# Patient Record
Sex: FEMALE | Race: WHITE | Employment: UNEMPLOYED | URBAN - METROPOLITAN AREA
[De-identification: names, ages, dates, MRNs, and addresses within clinical notes are randomized per-mention and may not be internally consistent; named-entity substitution may affect disease eponyms.]

---

## 2018-09-24 ENCOUNTER — HOSPITAL ENCOUNTER (OUTPATIENT)
Age: 53
Discharge: HOME | End: 2018-09-24

## 2018-09-24 LAB
T4 TOTAL: 9.3 MCG/DL (ref 4.5–11.7)
TSH SERPL DL<=0.05 MIU/L-ACNC: 9.31 UIU/ML (ref 0.27–4.2)

## 2018-09-24 PROCEDURE — 84436 ASSAY OF TOTAL THYROXINE: CPT

## 2018-09-24 PROCEDURE — 36415 COLL VENOUS BLD VENIPUNCTURE: CPT

## 2018-09-24 PROCEDURE — 84443 ASSAY THYROID STIM HORMONE: CPT

## 2018-12-18 PROBLEM — M50.221 HERNIATED NUCLEUS PULPOSUS, C4-5: Status: ACTIVE | Noted: 2018-12-18

## 2018-12-18 PROBLEM — M50.21 HERNIATED NUCLEUS PULPOSUS, C3-4: Status: ACTIVE | Noted: 2018-12-18

## 2018-12-18 PROBLEM — M50.222 HERNIATED NUCLEUS PULPOSUS, C5-6: Status: ACTIVE | Noted: 2018-12-18

## 2023-11-25 ENCOUNTER — HOSPITAL ENCOUNTER (INPATIENT)
Age: 58
LOS: 4 days | DRG: 871 | End: 2023-11-29
Attending: EMERGENCY MEDICINE | Admitting: INTERNAL MEDICINE

## 2023-11-25 ENCOUNTER — APPOINTMENT (OUTPATIENT)
Dept: CT IMAGING | Age: 58
DRG: 871 | End: 2023-11-25

## 2023-11-25 DIAGNOSIS — E87.8 HYPOCHLOREMIA: ICD-10-CM

## 2023-11-25 DIAGNOSIS — E87.6 HYPOKALEMIA: ICD-10-CM

## 2023-11-25 DIAGNOSIS — E87.1 HYPONATREMIA: Primary | ICD-10-CM

## 2023-11-25 DIAGNOSIS — M62.82 NON-TRAUMATIC RHABDOMYOLYSIS: ICD-10-CM

## 2023-11-25 DIAGNOSIS — R57.0: ICD-10-CM

## 2023-11-25 DIAGNOSIS — E03.9 HYPOTHYROIDISM, UNSPECIFIED TYPE: ICD-10-CM

## 2023-11-25 DIAGNOSIS — R41.82 ALTERED MENTAL STATUS, UNSPECIFIED ALTERED MENTAL STATUS TYPE: ICD-10-CM

## 2023-11-25 LAB
ALBUMIN SERPL-MCNC: 5.2 G/DL (ref 3.5–5.2)
ALP SERPL-CCNC: 140 U/L (ref 35–104)
ALT SERPL-CCNC: 42 U/L (ref 0–32)
AMMONIA PLAS-SCNC: 41 UMOL/L (ref 11–51)
AMPHET UR QL SCN: NEGATIVE
ANION GAP SERPL CALCULATED.3IONS-SCNC: 13 MMOL/L (ref 7–16)
AST SERPL-CCNC: 168 U/L (ref 0–31)
BACTERIA URNS QL MICRO: ABNORMAL
BARBITURATES UR QL SCN: NEGATIVE
BASOPHILS # BLD: 0.02 K/UL (ref 0–0.2)
BASOPHILS NFR BLD: 0 % (ref 0–2)
BENZODIAZ UR QL: NEGATIVE
BILIRUB SERPL-MCNC: 0.4 MG/DL (ref 0–1.2)
BILIRUB UR QL STRIP: NEGATIVE
BUN SERPL-MCNC: 13 MG/DL (ref 6–20)
BUPRENORPHINE UR QL: NEGATIVE
CALCIUM SERPL-MCNC: 9.5 MG/DL (ref 8.6–10.2)
CANNABINOIDS UR QL SCN: NEGATIVE
CASTS #/AREA URNS LPF: ABNORMAL /LPF
CHLORIDE SERPL-SCNC: 71 MMOL/L (ref 98–107)
CHLORIDE UR-SCNC: 35 MMOL/L
CK SERPL-CCNC: 7505 U/L (ref 20–180)
CLARITY UR: CLEAR
CO2 SERPL-SCNC: 25 MMOL/L (ref 22–29)
COCAINE UR QL SCN: NEGATIVE
COLOR UR: YELLOW
CREAT SERPL-MCNC: 0.8 MG/DL (ref 0.5–1)
CREAT UR-MCNC: 177.3 MG/DL (ref 29–226)
EOSINOPHIL # BLD: 0.11 K/UL (ref 0.05–0.5)
EOSINOPHILS RELATIVE PERCENT: 1 % (ref 0–6)
EPI CELLS #/AREA URNS HPF: ABNORMAL /HPF
ERYTHROCYTE [DISTWIDTH] IN BLOOD BY AUTOMATED COUNT: 12.4 % (ref 11.5–15)
FENTANYL UR QL: NEGATIVE
GFR SERPL CREATININE-BSD FRML MDRD: >60 ML/MIN/1.73M2
GLUCOSE BLD-MCNC: 168 MG/DL (ref 74–99)
GLUCOSE SERPL-MCNC: 132 MG/DL (ref 74–99)
GLUCOSE UR STRIP-MCNC: NEGATIVE MG/DL
HCT VFR BLD AUTO: 38.6 % (ref 34–48)
HGB BLD-MCNC: 14.4 G/DL (ref 11.5–15.5)
HGB UR QL STRIP.AUTO: ABNORMAL
IMM GRANULOCYTES # BLD AUTO: 0.06 K/UL (ref 0–0.58)
IMM GRANULOCYTES NFR BLD: 1 % (ref 0–5)
KETONES UR STRIP-MCNC: NEGATIVE MG/DL
LACTATE BLDV-SCNC: 4 MMOL/L (ref 0.5–1.9)
LEUKOCYTE ESTERASE UR QL STRIP: NEGATIVE
LYMPHOCYTES NFR BLD: 1.8 K/UL (ref 1.5–4)
LYMPHOCYTES RELATIVE PERCENT: 15 % (ref 20–42)
MCH RBC QN AUTO: 31.4 PG (ref 26–35)
MCHC RBC AUTO-ENTMCNC: 37.3 G/DL (ref 32–34.5)
MCV RBC AUTO: 84.1 FL (ref 80–99.9)
METHADONE UR QL: NEGATIVE
MONOCYTES NFR BLD: 0.51 K/UL (ref 0.1–0.95)
MONOCYTES NFR BLD: 4 % (ref 2–12)
NEUTROPHILS NFR BLD: 80 % (ref 43–80)
NEUTS SEG NFR BLD: 9.86 K/UL (ref 1.8–7.3)
NITRITE UR QL STRIP: NEGATIVE
OPIATES UR QL SCN: POSITIVE
OXYCODONE UR QL SCN: NEGATIVE
PCP UR QL SCN: NEGATIVE
PH UR STRIP: 6.5 [PH] (ref 5–9)
PLATELET, FLUORESCENCE: 281 K/UL (ref 130–450)
PMV BLD AUTO: 10.9 FL (ref 7–12)
POTASSIUM SERPL-SCNC: 2.9 MMOL/L (ref 3.5–5)
POTASSIUM, UR: 33 MMOL/L
PROT SERPL-MCNC: 7.8 G/DL (ref 6.4–8.3)
PROT UR STRIP-MCNC: 100 MG/DL
RBC # BLD AUTO: 4.59 M/UL (ref 3.5–5.5)
RBC #/AREA URNS HPF: ABNORMAL /HPF
SODIUM SERPL-SCNC: 109 MMOL/L (ref 132–146)
SODIUM UR-SCNC: <20 MMOL/L
SP GR UR STRIP: 1.02 (ref 1–1.03)
T4 SERPL-MCNC: <0.4 UG/DL (ref 4.5–11.7)
TEST INFORMATION: ABNORMAL
TSH SERPL DL<=0.05 MIU/L-ACNC: 87.28 UIU/ML (ref 0.27–4.2)
UROBILINOGEN UR STRIP-ACNC: 0.2 EU/DL (ref 0–1)
UUN UR-MCNC: 612 MG/DL (ref 800–1666)
WBC #/AREA URNS HPF: ABNORMAL /HPF
WBC OTHER # BLD: 12.4 K/UL (ref 4.5–11.5)

## 2023-11-25 PROCEDURE — 2580000003 HC RX 258

## 2023-11-25 PROCEDURE — 82436 ASSAY OF URINE CHLORIDE: CPT

## 2023-11-25 PROCEDURE — 81001 URINALYSIS AUTO W/SCOPE: CPT

## 2023-11-25 PROCEDURE — 6360000004 HC RX CONTRAST MEDICATION: Performed by: RADIOLOGY

## 2023-11-25 PROCEDURE — 84300 ASSAY OF URINE SODIUM: CPT

## 2023-11-25 PROCEDURE — 87086 URINE CULTURE/COLONY COUNT: CPT

## 2023-11-25 PROCEDURE — 71250 CT THORAX DX C-: CPT

## 2023-11-25 PROCEDURE — 6370000000 HC RX 637 (ALT 250 FOR IP)

## 2023-11-25 PROCEDURE — 82962 GLUCOSE BLOOD TEST: CPT

## 2023-11-25 PROCEDURE — 84443 ASSAY THYROID STIM HORMONE: CPT

## 2023-11-25 PROCEDURE — 87040 BLOOD CULTURE FOR BACTERIA: CPT

## 2023-11-25 PROCEDURE — 99291 CRITICAL CARE FIRST HOUR: CPT

## 2023-11-25 PROCEDURE — 87077 CULTURE AEROBIC IDENTIFY: CPT

## 2023-11-25 PROCEDURE — 83930 ASSAY OF BLOOD OSMOLALITY: CPT

## 2023-11-25 PROCEDURE — 99285 EMERGENCY DEPT VISIT HI MDM: CPT

## 2023-11-25 PROCEDURE — 72125 CT NECK SPINE W/O DYE: CPT

## 2023-11-25 PROCEDURE — 82140 ASSAY OF AMMONIA: CPT

## 2023-11-25 PROCEDURE — 80053 COMPREHEN METABOLIC PANEL: CPT

## 2023-11-25 PROCEDURE — 2000000000 HC ICU R&B

## 2023-11-25 PROCEDURE — 6360000002 HC RX W HCPCS

## 2023-11-25 PROCEDURE — 84540 ASSAY OF URINE/UREA-N: CPT

## 2023-11-25 PROCEDURE — 70496 CT ANGIOGRAPHY HEAD: CPT

## 2023-11-25 PROCEDURE — 87154 CUL TYP ID BLD PTHGN 6+ TRGT: CPT

## 2023-11-25 PROCEDURE — 84133 ASSAY OF URINE POTASSIUM: CPT

## 2023-11-25 PROCEDURE — 83935 ASSAY OF URINE OSMOLALITY: CPT

## 2023-11-25 PROCEDURE — 82550 ASSAY OF CK (CPK): CPT

## 2023-11-25 PROCEDURE — 70450 CT HEAD/BRAIN W/O DYE: CPT

## 2023-11-25 PROCEDURE — 51702 INSERT TEMP BLADDER CATH: CPT

## 2023-11-25 PROCEDURE — 82533 TOTAL CORTISOL: CPT

## 2023-11-25 PROCEDURE — 84436 ASSAY OF TOTAL THYROXINE: CPT

## 2023-11-25 PROCEDURE — 74176 CT ABD & PELVIS W/O CONTRAST: CPT

## 2023-11-25 PROCEDURE — 82570 ASSAY OF URINE CREATININE: CPT

## 2023-11-25 PROCEDURE — 85025 COMPLETE CBC W/AUTO DIFF WBC: CPT

## 2023-11-25 PROCEDURE — G0480 DRUG TEST DEF 1-7 CLASSES: HCPCS

## 2023-11-25 PROCEDURE — 80179 DRUG ASSAY SALICYLATE: CPT

## 2023-11-25 PROCEDURE — 70498 CT ANGIOGRAPHY NECK: CPT

## 2023-11-25 PROCEDURE — 2500000003 HC RX 250 WO HCPCS

## 2023-11-25 PROCEDURE — 80143 DRUG ASSAY ACETAMINOPHEN: CPT

## 2023-11-25 PROCEDURE — 83605 ASSAY OF LACTIC ACID: CPT

## 2023-11-25 PROCEDURE — 80307 DRUG TEST PRSMV CHEM ANLYZR: CPT

## 2023-11-25 RX ORDER — TETANUS AND DIPHTHERIA TOXOIDS ADSORBED 2; 2 [LF]/.5ML; [LF]/.5ML
0.5 INJECTION INTRAMUSCULAR ONCE
Status: DISCONTINUED | OUTPATIENT
Start: 2023-11-25 | End: 2023-11-26

## 2023-11-25 RX ORDER — 3% SODIUM CHLORIDE 3 G/100ML
75 INJECTION, SOLUTION INTRAVENOUS ONCE
Status: DISCONTINUED | OUTPATIENT
Start: 2023-11-26 | End: 2023-11-26

## 2023-11-25 RX ORDER — SODIUM CHLORIDE 0.9 % (FLUSH) 0.9 %
5-40 SYRINGE (ML) INJECTION EVERY 12 HOURS SCHEDULED
Status: DISCONTINUED | OUTPATIENT
Start: 2023-11-25 | End: 2023-11-29

## 2023-11-25 RX ORDER — POTASSIUM CHLORIDE 20 MEQ/1
40 TABLET, EXTENDED RELEASE ORAL ONCE
Status: COMPLETED | OUTPATIENT
Start: 2023-11-25 | End: 2023-11-25

## 2023-11-25 RX ORDER — SODIUM CHLORIDE 9 MG/ML
INJECTION, SOLUTION INTRAVENOUS PRN
Status: DISCONTINUED | OUTPATIENT
Start: 2023-11-25 | End: 2023-11-29 | Stop reason: HOSPADM

## 2023-11-25 RX ORDER — 0.9 % SODIUM CHLORIDE 0.9 %
1000 INTRAVENOUS SOLUTION INTRAVENOUS ONCE
Status: DISCONTINUED | OUTPATIENT
Start: 2023-11-25 | End: 2023-11-25

## 2023-11-25 RX ORDER — SODIUM CHLORIDE 0.9 % (FLUSH) 0.9 %
5-40 SYRINGE (ML) INJECTION PRN
Status: DISCONTINUED | OUTPATIENT
Start: 2023-11-25 | End: 2023-11-29 | Stop reason: HOSPADM

## 2023-11-25 RX ADMIN — POTASSIUM CHLORIDE 40 MEQ: 1500 TABLET, EXTENDED RELEASE ORAL at 23:53

## 2023-11-25 RX ADMIN — IOPAMIDOL 75 ML: 755 INJECTION, SOLUTION INTRAVENOUS at 21:14

## 2023-11-26 ENCOUNTER — APPOINTMENT (OUTPATIENT)
Dept: GENERAL RADIOLOGY | Age: 58
DRG: 871 | End: 2023-11-26

## 2023-11-26 LAB
ALBUMIN SERPL-MCNC: 4.2 G/DL (ref 3.5–5.2)
ALP SERPL-CCNC: 102 U/L (ref 35–104)
ALT SERPL-CCNC: 36 U/L (ref 0–32)
ANION GAP SERPL CALCULATED.3IONS-SCNC: 11 MMOL/L (ref 7–16)
ANION GAP SERPL CALCULATED.3IONS-SCNC: 12 MMOL/L (ref 7–16)
ANION GAP SERPL CALCULATED.3IONS-SCNC: 13 MMOL/L (ref 7–16)
ANION GAP SERPL CALCULATED.3IONS-SCNC: 6 MMOL/L (ref 7–16)
ANION GAP SERPL CALCULATED.3IONS-SCNC: 6 MMOL/L (ref 7–16)
ANION GAP SERPL CALCULATED.3IONS-SCNC: 8 MMOL/L (ref 7–16)
ANION GAP SERPL CALCULATED.3IONS-SCNC: 9 MMOL/L (ref 7–16)
APAP SERPL-MCNC: <5 UG/ML (ref 10–30)
AST SERPL-CCNC: 151 U/L (ref 0–31)
BASOPHILS # BLD: 0.01 K/UL (ref 0–0.2)
BILIRUB SERPL-MCNC: 0.4 MG/DL (ref 0–1.2)
BNP SERPL-MCNC: 2511 PG/ML (ref 0–450)
BUN SERPL-MCNC: 12 MG/DL (ref 6–20)
BUN SERPL-MCNC: 13 MG/DL (ref 6–20)
BUN SERPL-MCNC: 14 MG/DL (ref 6–20)
BUN SERPL-MCNC: 14 MG/DL (ref 6–20)
BUN SERPL-MCNC: 8 MG/DL (ref 6–20)
CALCIUM SERPL-MCNC: 4.8 MG/DL (ref 8.6–10.2)
CALCIUM SERPL-MCNC: 8.1 MG/DL (ref 8.6–10.2)
CALCIUM SERPL-MCNC: 8.2 MG/DL (ref 8.6–10.2)
CALCIUM SERPL-MCNC: 8.3 MG/DL (ref 8.6–10.2)
CALCIUM SERPL-MCNC: 8.4 MG/DL (ref 8.6–10.2)
CALCIUM SERPL-MCNC: 8.5 MG/DL (ref 8.6–10.2)
CALCIUM SERPL-MCNC: 8.7 MG/DL (ref 8.6–10.2)
CALCIUM SERPL-MCNC: 8.8 MG/DL (ref 8.6–10.2)
CALCIUM SERPL-MCNC: 9 MG/DL (ref 8.6–10.2)
CHLORIDE SERPL-SCNC: 101 MMOL/L (ref 98–107)
CHLORIDE SERPL-SCNC: 76 MMOL/L (ref 98–107)
CHLORIDE SERPL-SCNC: 77 MMOL/L (ref 98–107)
CHLORIDE SERPL-SCNC: 78 MMOL/L (ref 98–107)
CHLORIDE SERPL-SCNC: 79 MMOL/L (ref 98–107)
CHLORIDE SERPL-SCNC: 79 MMOL/L (ref 98–107)
CK SERPL-CCNC: 6792 U/L (ref 20–180)
CO2 SERPL-SCNC: 16 MMOL/L (ref 22–29)
CO2 SERPL-SCNC: 24 MMOL/L (ref 22–29)
CO2 SERPL-SCNC: 24 MMOL/L (ref 22–29)
CO2 SERPL-SCNC: 25 MMOL/L (ref 22–29)
CO2 SERPL-SCNC: 26 MMOL/L (ref 22–29)
CO2 SERPL-SCNC: 27 MMOL/L (ref 22–29)
CO2 SERPL-SCNC: 27 MMOL/L (ref 22–29)
CO2 SERPL-SCNC: 28 MMOL/L (ref 22–29)
CO2 SERPL-SCNC: 29 MMOL/L (ref 22–29)
CORTIS SERPL-MCNC: 185.5 UG/DL (ref 2.7–18.4)
CORTIS SERPL-MCNC: 43.8 UG/DL (ref 2.7–18.4)
CORTISOL COLLECTION INFO: ABNORMAL
CORTISOL COLLECTION INFO: ABNORMAL
CREAT SERPL-MCNC: 0.4 MG/DL (ref 0.5–1)
CREAT SERPL-MCNC: 0.7 MG/DL (ref 0.5–1)
CREAT SERPL-MCNC: 0.8 MG/DL (ref 0.5–1)
EKG ATRIAL RATE: 60 BPM
EKG P AXIS: 61 DEGREES
EKG P-R INTERVAL: 176 MS
EKG Q-T INTERVAL: 526 MS
EKG QRS DURATION: 88 MS
EKG QTC CALCULATION (BAZETT): 526 MS
EKG R AXIS: 46 DEGREES
EKG T AXIS: 92 DEGREES
EKG VENTRICULAR RATE: 60 BPM
EOSINOPHIL # BLD: 0.04 K/UL (ref 0.05–0.5)
ERYTHROCYTE [DISTWIDTH] IN BLOOD BY AUTOMATED COUNT: 12.8 % (ref 11.5–15)
ETHANOLAMINE SERPL-MCNC: <10 MG/DL
GFR SERPL CREATININE-BSD FRML MDRD: >60 ML/MIN/1.73M2
GLUCOSE BLD-MCNC: 124 MG/DL (ref 74–99)
GLUCOSE BLD-MCNC: 145 MG/DL (ref 74–99)
GLUCOSE BLD-MCNC: 151 MG/DL (ref 74–99)
GLUCOSE BLD-MCNC: 152 MG/DL (ref 74–99)
GLUCOSE SERPL-MCNC: 109 MG/DL (ref 74–99)
GLUCOSE SERPL-MCNC: 110 MG/DL (ref 74–99)
GLUCOSE SERPL-MCNC: 116 MG/DL (ref 74–99)
GLUCOSE SERPL-MCNC: 119 MG/DL (ref 74–99)
GLUCOSE SERPL-MCNC: 133 MG/DL (ref 74–99)
GLUCOSE SERPL-MCNC: 136 MG/DL (ref 74–99)
GLUCOSE SERPL-MCNC: 141 MG/DL (ref 74–99)
GLUCOSE SERPL-MCNC: 160 MG/DL (ref 74–99)
GLUCOSE SERPL-MCNC: 75 MG/DL (ref 74–99)
HCT VFR BLD AUTO: 31.5 % (ref 34–48)
HGB BLD-MCNC: 11.8 G/DL (ref 11.5–15.5)
IMM GRANULOCYTES # BLD AUTO: 0.08 K/UL (ref 0–0.58)
IMM GRANULOCYTES NFR BLD: 1 % (ref 0–5)
L PNEUMO1 AG UR QL IA.RAPID: NEGATIVE
LACTATE BLDV-SCNC: 2.5 MMOL/L (ref 0.5–1.9)
LIPASE SERPL-CCNC: 19 U/L (ref 13–60)
LYMPHOCYTES NFR BLD: 0.98 K/UL (ref 1.5–4)
LYMPHOCYTES RELATIVE PERCENT: 7 % (ref 20–42)
MAGNESIUM SERPL-MCNC: 1.8 MG/DL (ref 1.6–2.6)
MAGNESIUM SERPL-MCNC: 1.8 MG/DL (ref 1.6–2.6)
MCH RBC QN AUTO: 31.3 PG (ref 26–35)
MCHC RBC AUTO-ENTMCNC: 37.5 G/DL (ref 32–34.5)
MCV RBC AUTO: 83.6 FL (ref 80–99.9)
MONOCYTES NFR BLD: 0.65 K/UL (ref 0.1–0.95)
MONOCYTES NFR BLD: 5 % (ref 2–12)
NEUTROPHILS NFR BLD: 87 % (ref 43–80)
NEUTS SEG NFR BLD: 12.31 K/UL (ref 1.8–7.3)
OSMOLALITY SERPL: 245 MOSM/KG (ref 285–310)
OSMOLALITY UR: 509 MOSM/KG (ref 300–900)
OSMOLALITY UR: 520 MOSM/KG (ref 300–900)
OSMOLALITY UR: 566 MOSM/KG (ref 300–900)
OSMOLALITY UR: 643 MOSM/KG (ref 300–900)
PHOSPHATE SERPL-MCNC: 2.5 MG/DL (ref 2.5–4.5)
PLATELET, FLUORESCENCE: 233 K/UL (ref 130–450)
PMV BLD AUTO: 10.6 FL (ref 7–12)
POTASSIUM SERPL-SCNC: 2.5 MMOL/L (ref 3.5–5)
POTASSIUM SERPL-SCNC: 2.9 MMOL/L (ref 3.5–5)
POTASSIUM SERPL-SCNC: 3 MMOL/L (ref 3.5–5)
POTASSIUM SERPL-SCNC: 3.3 MMOL/L (ref 3.5–5)
POTASSIUM SERPL-SCNC: 3.4 MMOL/L (ref 3.5–5)
POTASSIUM SERPL-SCNC: 3.4 MMOL/L (ref 3.5–5)
POTASSIUM SERPL-SCNC: 3.7 MMOL/L (ref 3.5–5)
PROCALCITONIN SERPL-MCNC: 0.07 NG/ML (ref 0–0.08)
PROCALCITONIN SERPL-MCNC: 0.07 NG/ML (ref 0–0.08)
PROT SERPL-MCNC: 6.1 G/DL (ref 6.4–8.3)
RBC # BLD AUTO: 3.77 M/UL (ref 3.5–5.5)
S PNEUM AG SPEC QL: NEGATIVE
SALICYLATES SERPL-MCNC: <0.3 MG/DL (ref 0–30)
SODIUM SERPL-SCNC: 110 MMOL/L (ref 132–146)
SODIUM SERPL-SCNC: 110 MMOL/L (ref 132–146)
SODIUM SERPL-SCNC: 111 MMOL/L (ref 132–146)
SODIUM SERPL-SCNC: 111 MMOL/L (ref 132–146)
SODIUM SERPL-SCNC: 115 MMOL/L (ref 132–146)
SODIUM SERPL-SCNC: 117 MMOL/L (ref 132–146)
SODIUM SERPL-SCNC: 123 MMOL/L (ref 132–146)
SODIUM UR-SCNC: 45 MMOL/L
SODIUM UR-SCNC: <20 MMOL/L
SODIUM UR-SCNC: <20 MMOL/L
SPECIMEN SOURCE: NORMAL
T3FREE SERPL-MCNC: <0.26 PG/ML (ref 2–4.4)
T4 FREE SERPL-MCNC: <0.1 NG/DL (ref 0.9–1.7)
TOXIC TRICYCLIC SC,BLOOD: NEGATIVE
TROPONIN I SERPL HS-MCNC: 76 NG/L (ref 0–9)
TROPONIN I SERPL HS-MCNC: 76 NG/L (ref 0–9)
TSH SERPL DL<=0.05 MIU/L-ACNC: 53.28 UIU/ML (ref 0.27–4.2)
URATE SERPL-MCNC: 2 MG/DL (ref 2.4–5.7)
WBC OTHER # BLD: 14.1 K/UL (ref 4.5–11.5)

## 2023-11-26 PROCEDURE — 80053 COMPREHEN METABOLIC PANEL: CPT

## 2023-11-26 PROCEDURE — 36415 COLL VENOUS BLD VENIPUNCTURE: CPT

## 2023-11-26 PROCEDURE — 85025 COMPLETE CBC W/AUTO DIFF WBC: CPT

## 2023-11-26 PROCEDURE — 6370000000 HC RX 637 (ALT 250 FOR IP): Performed by: INTERNAL MEDICINE

## 2023-11-26 PROCEDURE — 2580000003 HC RX 258

## 2023-11-26 PROCEDURE — 84484 ASSAY OF TROPONIN QUANT: CPT

## 2023-11-26 PROCEDURE — 6370000000 HC RX 637 (ALT 250 FOR IP)

## 2023-11-26 PROCEDURE — 6360000002 HC RX W HCPCS

## 2023-11-26 PROCEDURE — 6360000002 HC RX W HCPCS: Performed by: INTERNAL MEDICINE

## 2023-11-26 PROCEDURE — 93010 ELECTROCARDIOGRAM REPORT: CPT | Performed by: INTERNAL MEDICINE

## 2023-11-26 PROCEDURE — 84481 FREE ASSAY (FT-3): CPT

## 2023-11-26 PROCEDURE — 84300 ASSAY OF URINE SODIUM: CPT

## 2023-11-26 PROCEDURE — 84443 ASSAY THYROID STIM HORMONE: CPT

## 2023-11-26 PROCEDURE — 2500000003 HC RX 250 WO HCPCS

## 2023-11-26 PROCEDURE — 99291 CRITICAL CARE FIRST HOUR: CPT | Performed by: INTERNAL MEDICINE

## 2023-11-26 PROCEDURE — 84439 ASSAY OF FREE THYROXINE: CPT

## 2023-11-26 PROCEDURE — 84100 ASSAY OF PHOSPHORUS: CPT

## 2023-11-26 PROCEDURE — 71045 X-RAY EXAM CHEST 1 VIEW: CPT

## 2023-11-26 PROCEDURE — 83880 ASSAY OF NATRIURETIC PEPTIDE: CPT

## 2023-11-26 PROCEDURE — 2700000000 HC OXYGEN THERAPY PER DAY

## 2023-11-26 PROCEDURE — 2580000003 HC RX 258: Performed by: INTERNAL MEDICINE

## 2023-11-26 PROCEDURE — 87899 AGENT NOS ASSAY W/OPTIC: CPT

## 2023-11-26 PROCEDURE — 83735 ASSAY OF MAGNESIUM: CPT

## 2023-11-26 PROCEDURE — 82962 GLUCOSE BLOOD TEST: CPT

## 2023-11-26 PROCEDURE — 2000000000 HC ICU R&B

## 2023-11-26 PROCEDURE — 83935 ASSAY OF URINE OSMOLALITY: CPT

## 2023-11-26 PROCEDURE — 82550 ASSAY OF CK (CPK): CPT

## 2023-11-26 PROCEDURE — 84145 PROCALCITONIN (PCT): CPT

## 2023-11-26 PROCEDURE — 87449 NOS EACH ORGANISM AG IA: CPT

## 2023-11-26 PROCEDURE — 87081 CULTURE SCREEN ONLY: CPT

## 2023-11-26 PROCEDURE — 83690 ASSAY OF LIPASE: CPT

## 2023-11-26 PROCEDURE — 93005 ELECTROCARDIOGRAM TRACING: CPT

## 2023-11-26 PROCEDURE — 84550 ASSAY OF BLOOD/URIC ACID: CPT

## 2023-11-26 PROCEDURE — 82533 TOTAL CORTISOL: CPT

## 2023-11-26 PROCEDURE — 83605 ASSAY OF LACTIC ACID: CPT

## 2023-11-26 PROCEDURE — 2500000003 HC RX 250 WO HCPCS: Performed by: INTERNAL MEDICINE

## 2023-11-26 PROCEDURE — 80048 BASIC METABOLIC PNL TOTAL CA: CPT

## 2023-11-26 RX ORDER — HYDRALAZINE HYDROCHLORIDE 20 MG/ML
10 INJECTION INTRAMUSCULAR; INTRAVENOUS EVERY 6 HOURS PRN
Status: DISCONTINUED | OUTPATIENT
Start: 2023-11-26 | End: 2023-11-26

## 2023-11-26 RX ORDER — POLYETHYLENE GLYCOL 3350 17 G/17G
17 POWDER, FOR SOLUTION ORAL DAILY PRN
Status: DISCONTINUED | OUTPATIENT
Start: 2023-11-26 | End: 2023-11-29 | Stop reason: HOSPADM

## 2023-11-26 RX ORDER — HYDRALAZINE HYDROCHLORIDE 20 MG/ML
10 INJECTION INTRAMUSCULAR; INTRAVENOUS EVERY 4 HOURS PRN
Status: DISCONTINUED | OUTPATIENT
Start: 2023-11-26 | End: 2023-11-29

## 2023-11-26 RX ORDER — SODIUM CHLORIDE 9 MG/ML
INJECTION, SOLUTION INTRAVENOUS PRN
Status: DISCONTINUED | OUTPATIENT
Start: 2023-11-26 | End: 2023-11-29 | Stop reason: HOSPADM

## 2023-11-26 RX ORDER — INSULIN LISPRO 100 [IU]/ML
0-4 INJECTION, SOLUTION INTRAVENOUS; SUBCUTANEOUS NIGHTLY
Status: DISCONTINUED | OUTPATIENT
Start: 2023-11-26 | End: 2023-11-29

## 2023-11-26 RX ORDER — POTASSIUM CHLORIDE 7.45 MG/ML
10 INJECTION INTRAVENOUS PRN
Status: DISCONTINUED | OUTPATIENT
Start: 2023-11-26 | End: 2023-11-27

## 2023-11-26 RX ORDER — DEXTROSE MONOHYDRATE 100 MG/ML
INJECTION, SOLUTION INTRAVENOUS CONTINUOUS PRN
Status: DISCONTINUED | OUTPATIENT
Start: 2023-11-26 | End: 2023-11-29 | Stop reason: HOSPADM

## 2023-11-26 RX ORDER — LEVOTHYROXINE SODIUM ANHYDROUS 100 UG/5ML
100 INJECTION, POWDER, LYOPHILIZED, FOR SOLUTION INTRAVENOUS DAILY
Status: DISCONTINUED | OUTPATIENT
Start: 2023-11-27 | End: 2023-11-28

## 2023-11-26 RX ORDER — 3% SODIUM CHLORIDE 3 G/100ML
75 INJECTION, SOLUTION INTRAVENOUS ONCE
Status: COMPLETED | OUTPATIENT
Start: 2023-11-26 | End: 2023-11-26

## 2023-11-26 RX ORDER — SODIUM CHLORIDE 9 MG/ML
INJECTION, SOLUTION INTRAVENOUS CONTINUOUS
Status: DISCONTINUED | OUTPATIENT
Start: 2023-11-26 | End: 2023-11-26

## 2023-11-26 RX ORDER — INSULIN LISPRO 100 [IU]/ML
0-4 INJECTION, SOLUTION INTRAVENOUS; SUBCUTANEOUS
Status: DISCONTINUED | OUTPATIENT
Start: 2023-11-26 | End: 2023-11-26

## 2023-11-26 RX ORDER — SODIUM CHLORIDE 0.9 % (FLUSH) 0.9 %
5-40 SYRINGE (ML) INJECTION PRN
Status: DISCONTINUED | OUTPATIENT
Start: 2023-11-26 | End: 2023-11-29 | Stop reason: HOSPADM

## 2023-11-26 RX ORDER — CLONIDINE 0.3 MG/24H
1 PATCH, EXTENDED RELEASE TRANSDERMAL WEEKLY
Status: DISCONTINUED | OUTPATIENT
Start: 2023-11-26 | End: 2023-11-29

## 2023-11-26 RX ORDER — DEXMEDETOMIDINE HYDROCHLORIDE 4 UG/ML
.1-1.5 INJECTION, SOLUTION INTRAVENOUS CONTINUOUS
Status: DISCONTINUED | OUTPATIENT
Start: 2023-11-26 | End: 2023-11-29 | Stop reason: HOSPADM

## 2023-11-26 RX ORDER — ENOXAPARIN SODIUM 100 MG/ML
40 INJECTION SUBCUTANEOUS DAILY
Status: DISCONTINUED | OUTPATIENT
Start: 2023-11-26 | End: 2023-11-29

## 2023-11-26 RX ORDER — MAGNESIUM SULFATE IN WATER 40 MG/ML
2000 INJECTION, SOLUTION INTRAVENOUS PRN
Status: DISCONTINUED | OUTPATIENT
Start: 2023-11-26 | End: 2023-11-29 | Stop reason: HOSPADM

## 2023-11-26 RX ORDER — 3% SODIUM CHLORIDE 3 G/100ML
50 INJECTION, SOLUTION INTRAVENOUS ONCE
Status: COMPLETED | OUTPATIENT
Start: 2023-11-26 | End: 2023-11-26

## 2023-11-26 RX ORDER — POTASSIUM CHLORIDE 20 MEQ/1
40 TABLET, EXTENDED RELEASE ORAL PRN
Status: DISCONTINUED | OUTPATIENT
Start: 2023-11-26 | End: 2023-11-27

## 2023-11-26 RX ORDER — LEVOTHYROXINE SODIUM ANHYDROUS 100 UG/5ML
50 INJECTION, POWDER, LYOPHILIZED, FOR SOLUTION INTRAVENOUS EVERY 12 HOURS
Status: DISCONTINUED | OUTPATIENT
Start: 2023-11-26 | End: 2023-11-26

## 2023-11-26 RX ORDER — INSULIN LISPRO 100 [IU]/ML
0-4 INJECTION, SOLUTION INTRAVENOUS; SUBCUTANEOUS
Status: DISCONTINUED | OUTPATIENT
Start: 2023-11-26 | End: 2023-11-29

## 2023-11-26 RX ORDER — ACETAMINOPHEN 325 MG/1
650 TABLET ORAL EVERY 6 HOURS PRN
Status: DISCONTINUED | OUTPATIENT
Start: 2023-11-26 | End: 2023-11-29 | Stop reason: HOSPADM

## 2023-11-26 RX ORDER — IPRATROPIUM BROMIDE AND ALBUTEROL SULFATE 2.5; .5 MG/3ML; MG/3ML
1 SOLUTION RESPIRATORY (INHALATION) EVERY 4 HOURS PRN
Status: DISCONTINUED | OUTPATIENT
Start: 2023-11-26 | End: 2023-11-29 | Stop reason: HOSPADM

## 2023-11-26 RX ORDER — SODIUM CHLORIDE 1 G/1
1 TABLET ORAL 3 TIMES DAILY
Status: DISCONTINUED | OUTPATIENT
Start: 2023-11-26 | End: 2023-11-29 | Stop reason: HOSPADM

## 2023-11-26 RX ORDER — PANTOPRAZOLE SODIUM 40 MG/1
40 TABLET, DELAYED RELEASE ORAL
Status: DISCONTINUED | OUTPATIENT
Start: 2023-11-26 | End: 2023-11-28

## 2023-11-26 RX ORDER — LEVOTHYROXINE SODIUM ANHYDROUS 500 UG/5ML
50 INJECTION, POWDER, LYOPHILIZED, FOR SOLUTION INTRAVENOUS EVERY 12 HOURS
Status: DISCONTINUED | OUTPATIENT
Start: 2023-11-26 | End: 2023-11-26 | Stop reason: SDUPTHER

## 2023-11-26 RX ORDER — ACETAMINOPHEN 650 MG/1
650 SUPPOSITORY RECTAL EVERY 6 HOURS PRN
Status: DISCONTINUED | OUTPATIENT
Start: 2023-11-26 | End: 2023-11-29 | Stop reason: HOSPADM

## 2023-11-26 RX ORDER — LEVOTHYROXINE SODIUM ANHYDROUS 500 UG/5ML
50 INJECTION, POWDER, LYOPHILIZED, FOR SOLUTION INTRAVENOUS EVERY 12 HOURS
Status: DISCONTINUED | OUTPATIENT
Start: 2023-11-26 | End: 2023-11-26 | Stop reason: CLARIF

## 2023-11-26 RX ORDER — DEXTROSE MONOHYDRATE 50 MG/ML
INJECTION, SOLUTION INTRAVENOUS CONTINUOUS
Status: DISCONTINUED | OUTPATIENT
Start: 2023-11-26 | End: 2023-11-26

## 2023-11-26 RX ORDER — GLUCAGON 1 MG/ML
1 KIT INJECTION PRN
Status: DISCONTINUED | OUTPATIENT
Start: 2023-11-26 | End: 2023-11-29 | Stop reason: HOSPADM

## 2023-11-26 RX ORDER — SODIUM CHLORIDE 0.9 % (FLUSH) 0.9 %
5-40 SYRINGE (ML) INJECTION EVERY 12 HOURS SCHEDULED
Status: DISCONTINUED | OUTPATIENT
Start: 2023-11-26 | End: 2023-11-29 | Stop reason: HOSPADM

## 2023-11-26 RX ADMIN — SODIUM CHLORIDE 50 ML/HR: 3 INJECTION, SOLUTION INTRAVENOUS at 11:46

## 2023-11-26 RX ADMIN — HYDROCORTISONE SODIUM SUCCINATE 100 MG: 100 INJECTION, POWDER, FOR SOLUTION INTRAMUSCULAR; INTRAVENOUS at 06:50

## 2023-11-26 RX ADMIN — POTASSIUM CHLORIDE 10 MEQ: 7.46 INJECTION, SOLUTION INTRAVENOUS at 05:16

## 2023-11-26 RX ADMIN — LEVOTHYROXINE SODIUM ANHYDROUS 50 MCG: 100 INJECTION, POWDER, LYOPHILIZED, FOR SOLUTION INTRAVENOUS at 06:50

## 2023-11-26 RX ADMIN — Medication 0.5 MCG/KG/HR: at 12:55

## 2023-11-26 RX ADMIN — SODIUM CHLORIDE, PRESERVATIVE FREE 10 ML: 5 INJECTION INTRAVENOUS at 09:03

## 2023-11-26 RX ADMIN — HYDRALAZINE HYDROCHLORIDE 10 MG: 20 INJECTION, SOLUTION INTRAMUSCULAR; INTRAVENOUS at 07:33

## 2023-11-26 RX ADMIN — SODIUM CHLORIDE: 9 INJECTION, SOLUTION INTRAVENOUS at 06:35

## 2023-11-26 RX ADMIN — SODIUM CHLORIDE 1 G: 1 TABLET ORAL at 21:25

## 2023-11-26 RX ADMIN — Medication 0.7 MCG/KG/HR: at 20:23

## 2023-11-26 RX ADMIN — ENOXAPARIN SODIUM 40 MG: 100 INJECTION SUBCUTANEOUS at 09:02

## 2023-11-26 RX ADMIN — HYDROCORTISONE SODIUM SUCCINATE 100 MG: 100 INJECTION, POWDER, FOR SOLUTION INTRAMUSCULAR; INTRAVENOUS at 23:07

## 2023-11-26 RX ADMIN — HYDRALAZINE HYDROCHLORIDE 10 MG: 20 INJECTION, SOLUTION INTRAMUSCULAR; INTRAVENOUS at 23:07

## 2023-11-26 RX ADMIN — DEXTROSE MONOHYDRATE: 50 INJECTION, SOLUTION INTRAVENOUS at 15:52

## 2023-11-26 RX ADMIN — SODIUM CHLORIDE 75 ML/HR: 3 INJECTION, SOLUTION INTRAVENOUS at 00:15

## 2023-11-26 RX ADMIN — HYDROCORTISONE SODIUM SUCCINATE 100 MG: 100 INJECTION, POWDER, FOR SOLUTION INTRAMUSCULAR; INTRAVENOUS at 16:28

## 2023-11-26 RX ADMIN — SODIUM CHLORIDE, PRESERVATIVE FREE 10 ML: 5 INJECTION INTRAVENOUS at 20:33

## 2023-11-26 RX ADMIN — WATER 1000 MG: 1 INJECTION INTRAMUSCULAR; INTRAVENOUS; SUBCUTANEOUS at 09:02

## 2023-11-26 RX ADMIN — HYDROCORTISONE SODIUM SUCCINATE 100 MG: 100 INJECTION, POWDER, FOR SOLUTION INTRAMUSCULAR; INTRAVENOUS at 00:02

## 2023-11-26 RX ADMIN — HYDRALAZINE HYDROCHLORIDE 10 MG: 20 INJECTION, SOLUTION INTRAMUSCULAR; INTRAVENOUS at 13:07

## 2023-11-26 NOTE — PROGRESS NOTES
Dr. Pepper Faye updated with urine osmo & urine NA. No new orders    1425 - Dr. Pepper Faye updated with critical Na 117. See new orders. 200 - Dr. Pepper Faye updated with critical Na 115. See new orders.     Kyle Gonzalez RN  11/26/2023

## 2023-11-26 NOTE — PROGRESS NOTES
Patient's , Leigh Call was bedside to visit. He states that the pt was fine yesterday afternoon, pt was eating pizza and he went to a party and when he came home the pt was disoriented. He states that he was in the kitchen and heard a thud and he checked on the pt and she had fallen. Tried to review home medications with him and he stated that he does not know what she takes or if she has been taking her medications. He states they live in the same house but have separate bedrooms. If he tries to intervene or review anything with the pt she becomes very upset. He states that the pt takes a shower once a month before she goes to her monthly doctor appointment. Other than that doctor's appt, the pt does not leave the house. She sits on the couch or in bed. He states that the pt was seeing a psychologist and had medications filled  through [employer].

## 2023-11-26 NOTE — PROGRESS NOTES
4 Eyes Skin Assessment     NAME:  Danielle Smith  YOB: 1965  MEDICAL RECORD NUMBER:  [id number]    The patient is being assessed for  Admission    I agree that at least one RN has performed a thorough Head to Toe Skin Assessment on the patient. ALL assessment sites listed below have been assessed. Areas assessed by both nurses:    Head, Face, Ears, Shoulders, Back, Chest, Arms, Elbows, Hands, Sacrum. Buttock, Coccyx, Ischium, and Legs. Feet and Heels        Does the Patient have a Wound?  No  Skinned Left Knee  Bruise Right Shoulder  Abrasion and bruising Left Shoulder  Dry, pink heels             Nitin Prevention initiated by RN: Yes  Wound Care Orders initiated by RN: No    Pressure Injury (Stage 3,4, Unstageable, DTI, NWPT, and Complex wounds) if present, place Wound referral order by RN under : No    New Ostomies, if present place, Ostomy referral order under : No     Nurse 1 eSignature: Electronically signed by Verner Shoulders, RN on 11/26/23 at 6:39 AM EST    **SHARE this note so that the co-signing nurse can place an eSignature**    Nurse 2 eSignature: {Esignature:[id number]}

## 2023-11-26 NOTE — ED NOTES
Spoke with pharmacy and primary RN regarding levothyroxine administration and not verified med by pharmacy. Pharmacy states cannot verify until confirmed that patient has not been taking medication PO at home x 1 week. Primary RN states  has been contacted and is supposed to find out for us.      Bharath Carter, BRANDT  11/26/23 9500

## 2023-11-26 NOTE — ED PROVIDER NOTES
[address]        Pt Name: Danielle Floyd  MRN: [id number]  [address] [facility] 1965  Date of evaluation: 2023  Provider: Tad Balderas DO  PCP: Cherylene Decree, DO  Note Started: 9:13 PM EST 23    CHIEF COMPLAINT       Chief Complaint   Patient presents with    Altered Mental Status     Pt alert to self only, not answering questions appropriately. Unknown LKW       HISTORY OF PRESENT ILLNESS: 1 or more Elements   History From:     Limitations to history : Altered Mental Status    Danielle Floyd is a 59 y.o. female who presents to the emergency department for altered mental status that started today.  reports that he last saw the patient at 230 this afternoon when she was acting herself and then when he came home this evening she was confused including trying to light a cigarette with a pack of cigarettes. She believes the year is . She cannot provide any history. Patient's  also reports that she sustained a fall this evening prior to arrival so he called EMS for assistance. Limited HPI secondary to patient's altered mental status    Nursing Notes were all reviewed and agreed with or any disagreements were addressed in the HPI. REVIEW OF SYSTEMS :         Limited ROS secondary to altered mental status    SURGICAL HISTORY     Past Surgical History:   Procedure Laterality Date    APPENDECTOMY       SECTION      x3    DILATION AND CURETTAGE OF UTERUS      HYSTERECTOMY (CERVIX STATUS UNKNOWN)      KNEE SURGERY      x3  (2)left knee (1) right knee    SPINE SURGERY      ACDF    SPINE SURGERY      ACDF C3-4 - Dr. Tanya Durbin       Previous Medications    AMOXICILLIN (AMOXIL) 500 MG CAPSULE    Take 1 capsule by mouth 3 times daily    BUSPIRONE (BUSPAR) 15 MG TABLET    Take one(1) tablet three times daily. by me, the above displayed labs are abnormal. All other labs obtained during this visit were within normal range or not returned as of this dictation. RADIOLOGY:   Non-plain film images such as CT, Ultrasound and MRI are read by the radiologist. Plain radiographic images are visualized and preliminarily interpreted by the ED Provider with the below findings:        Interpretation per the Radiologist below, if available at the time of this note:    CT Head W/O Contrast   Preliminary Result   On unenhanced CT scan of the head, no evidence of intracranial hemorrhage or   any other definable acute intracranial abnormality. No definable focal   abnormality or acute process in brain. No fracture in calvarium or in base   of skull. On CT scan of cervical spine, no evidence of acute fracture or osseous   malalignment. Evidence of previous solidified anterior spinal body fusion   from C3 level through C6 level. No significant central spinal canal   stenosis. Evidence of mild-to-moderate multilevel neural foraminal stenosis. At C6-C7 level, severe stenosis of right neural foramen. On CTA of neck, there is no acute process. 30% stenosis at the origin of the   right internal carotid artery. 60-65% stenosis at the origin of the left   internal carotid artery (NASCET criteria). In the rest of bilateral carotid   arterial systems, there is no additional stenosis or dissection. Bilateral   vertebral arteries are patent without stenosis or dissection. On CTA of the head, no compromise in the intracranial anterior circulation of   bilateral internal carotid artery. No compromise in the vertebrobasilar   arterial circulation. No dural sinus thrombosis. No focal abnormality in   brain. CTA HEAD W CONTRAST   Preliminary Result   On unenhanced CT scan of the head, no evidence of intracranial hemorrhage or   any other definable acute intracranial abnormality.   No definable focal   abnormality or

## 2023-11-26 NOTE — PROGRESS NOTES
Critical Care Admit/Consult Note     Patient -  Alejandro Jiménez   MRN -  [id number]   [facility] # - [de-identified]   - 1965      Date of Admission -  2023  8:55 PM  Date of evaluation -  2023  IC06/IC06-01   Hospital Day - 1  Assessment and Plan  Mrs. Willie Aquino is a 59 y.o. female with the following medical problems:   AMS, multifactorial, metabolic encephalopathy   Hypotonic hyponatremia, likely prerenal  Severe hypothyroidism  Hypothermia 2/2 #3  Lactic acidosis  Hypokalemia   Elevated troponin, most likely demand ischemia, denies any chest pain  HF? CT Chest with cardiomegaly with mild pericardial effusion  Fall  Elevated CK secondary to hypothyroidism  Prolonged Qtc  UTI, UA with +1 bacteria  GERD  Hx HTN, home medication includes metoprolol  Depressive disorder, home medications include Seroquel, buspirone, fluoxetine,   Hx Insomnia, takes Ambien as home medication   Cervical radiculopathy, follows with Dr. Stevan Velásquez  ------------------------------------------------------------------------------  Continue to monitor neurovascular assessment, CT head negative for any acute process   Seizure precautions   Nephrology consulted, appreciate recommendations   S/p hypertonic solution   BMP per nephrology   Continue to monitor sodium  Strict I's and O's   Continue with heating blanket for temperature management   Start stress dose steroids, solu-cortef 100 mg Q8   Start levothyroxine 50 mcg BID, continue to follow TSH as needed  Obtain T3, T4 <0.04, TSH 87.7  Obtain Echo and proBNP  Continue to trend CK  Avoid agents that prolong Qtc  Start rocephin, await urine culture and other pan cultures   DVT  prophylaxis: Lovenox  GI prophylaxis: PPI  Diet: Speech therapy and start diet if tolerated. Precedex for agitation  CXR      Interval History: Ms. Willie Aquino is a 59year old who was admitted on  after presenting to the ED for AMS.  The patient's  reports the last known well injection 5-40 mL, 5-40 mL, IntraVENous, 2 times per day, Midge Havers U, DO, 10 mL at 11/26/23 0903    sodium chloride flush 0.9 % injection 5-40 mL, 5-40 mL, IntraVENous, PRN, Yael, Ismail U, DO    0.9 % sodium chloride infusion, , IntraVENous, PRN, Yael, Ismail U, DO    potassium chloride (KLOR-CON M) extended release tablet 40 mEq, 40 mEq, Oral, PRN **OR** potassium bicarb-citric acid (EFFER-K) effervescent tablet 40 mEq, 40 mEq, Oral, PRN **OR** potassium chloride 10 mEq/100 mL IVPB (Peripheral Line), 10 mEq, IntraVENous, PRN, Smith Gonzaleze, DO, Stopped at 11/26/23 0646    magnesium sulfate 2000 mg in 50 mL IVPB premix, 2,000 mg, IntraVENous, PRN, Yael, Ismail U, DO    enoxaparin (LOVENOX) injection 40 mg, 40 mg, SubCUTAneous, Daily, Yael, Ismail U, DO, 40 mg at 11/26/23 0902    polyethylene glycol (GLYCOLAX) packet 17 g, 17 g, Oral, Daily PRN, Yael, Ismail U, DO    acetaminophen (TYLENOL) tablet 650 mg, 650 mg, Oral, Q6H PRN **OR** acetaminophen (TYLENOL) suppository 650 mg, 650 mg, Rectal, Q6H PRN, Yael, Ismail U, DO    ipratropium 0.5 mg-albuterol 2.5 mg (DUONEB) nebulizer solution 1 Dose, 1 Dose, Inhalation, Q4H PRN, Niharika Oseguera APRN - CNP    hydrALAZINE (APRESOLINE) injection 10 mg, 10 mg, IntraVENous, Q6H PRN, Niharika Oseguera APRN - CNP, 10 mg at 11/26/23 0733    pantoprazole (PROTONIX) tablet 40 mg, 40 mg, Oral, QAM AC, Niharika Oseguera APRN - CNP    levothyroxine (SYNTHROID) injection 50 mcg, 50 mcg, IntraVENous, Q12H, Niharika Oseguera APRN - CNP, 50 mcg at 11/26/23 0650    cefTRIAXone (ROCEPHIN) 1,000 mg in sterile water 10 mL IV syringe, 1,000 mg, IntraVENous, Q24H, Niharika Oseguera APRN - CNP, 1,000 mg at 11/26/23 0902    3% sodium chloride (HYPERTONIC) IV infusion, 50 mL/hr, IntraVENous, Once, Jason Carias MD    sodium chloride flush 0.9 % injection 5-40 mL, 5-40 mL, IntraVENous, 2 times per day, Zach Mariee DO, 10 mL at 11/26/23 0903    sodium

## 2023-11-26 NOTE — ED NOTES
Radiology Procedure Waiver   Name: Emmanuel Izaguirre  : 1965  MRN: [id number]    Date:  23    Time: 9:04 PM EST    Benefits of immediately proceeding with Radiology exam(s) without pre-testing outweigh the risks or are not indicated as specified below and therefore the following is/are being waived:    [] Pregnancy test   [] Patients LMP on-time and regular.   [] Patient had Tubal Ligation or has other Contraception Device. [] Patient  is Menopausal or Premenarcheal.    [] Patient had Full or Partial Hysterectomy. [] Protocol for Iodine allergy    [] MRI Questionnaire     [x] BUN/Creatinine   [] Patient age w/no hx of renal dysfunction. [] Patient on Dialysis. [] Recent Normal Labs.   Electronically signed by Gigi De Los Santos DO on 23 at 9:04 PM EST               Gigi De Los Santos DO  Resident  [phone]

## 2023-11-26 NOTE — PROGRESS NOTES
Patient has developed periobital edema. Dr. Francisca Salcido updated. No new orders.     Grover Harrison RN  11/26/2023

## 2023-11-26 NOTE — PROGRESS NOTES
Pt's spouse bedside to visit. Updated him that Dr. Stephanie Younger has requested all of the pt's medications be brought in so that pharmacy can inventory & review what the pt has been prescribed. He will bring them in tomorrow.     Velasquez De La Garza RN  11/26/2023

## 2023-11-26 NOTE — CONSULTS
Patient seen and examined. Consult dictated. Patient is a 10-year-old lady who presents with altered mental status and serum sodium 109 mmol/L and a serum  potassium 2.9 mmol/L. No recent baseline serum sodium level available. Patient with a rather low BUN ,creatinine and a uric acid and patient being on multiple medications such as quetiapine, fluoxetine and buspirone suggest possible underlying SIADH mechanism. The relative lack of improvement in the serum sodium levels with hydration also points to an SIADH-like picture. Patient also has hypothyroidism with markedly elevated TSH and that may be playing a role in the patient's hyponatremia. Patient's initial urine sodium was low (<20mmol/L) with a high urine osmolality of  509 mOsm/kg suggest that patient may have hypovolemia contributing to the patient's hyponatremia . Now that the patient has been hydrated the urinary sodium has risen and the current urine sodium and osmolality more suggestive of an SIADH-like picture. Because of altered mental status will give another dose of hypertonic saline and have the patient on a fluid restriction. Will add salt tablets as needed. Will follow serial electrolytes. Hypokalemia of undetermined theology. Patient seems to have urinary potassium wasting with the urine potassium of 33 mmol/L with concurrent severe hypokalemia. Patient with a transtubular potassium gradient of 5 which also suggests urinary potassium wasting. The etiology of urinary potassium wasting uncertain. Will follow closely. Supplement potassium. Magnesium levels adequate. Benign essential hypertension. Blood pressures are on the high side. Will start the patient on calcium channel blocker. Rhabdomyolysis. Patient with high CPK levels. Etiology of rhabdomyolysis uncertain. Magnitude of the rhabdomyolysis is small.   In light of severe hyponatremia and possibility of underlying SIADH we will with limited on how aggressively

## 2023-11-27 ENCOUNTER — APPOINTMENT (OUTPATIENT)
Dept: GENERAL RADIOLOGY | Age: 58
DRG: 871 | End: 2023-11-27

## 2023-11-27 ENCOUNTER — APPOINTMENT (OUTPATIENT)
Age: 58
DRG: 871 | End: 2023-11-27

## 2023-11-27 PROBLEM — E87.1 HYPONATREMIA WITH DECREASED SERUM OSMOLALITY: Status: ACTIVE | Noted: 2023-11-27

## 2023-11-27 LAB
ALBUMIN SERPL-MCNC: 4 G/DL (ref 3.5–5.2)
ALP SERPL-CCNC: 109 U/L (ref 35–104)
ALT SERPL-CCNC: 46 U/L (ref 0–32)
ANION GAP SERPL CALCULATED.3IONS-SCNC: 7 MMOL/L (ref 7–16)
ANION GAP SERPL CALCULATED.3IONS-SCNC: 8 MMOL/L (ref 7–16)
ANION GAP SERPL CALCULATED.3IONS-SCNC: 8 MMOL/L (ref 7–16)
ANION GAP SERPL CALCULATED.3IONS-SCNC: 9 MMOL/L (ref 7–16)
ANION GAP SERPL CALCULATED.3IONS-SCNC: 9 MMOL/L (ref 7–16)
AST SERPL-CCNC: 197 U/L (ref 0–31)
BASOPHILS # BLD: 0.02 K/UL (ref 0–0.2)
BASOPHILS NFR BLD: 0 % (ref 0–2)
BILIRUB SERPL-MCNC: 0.3 MG/DL (ref 0–1.2)
BUN SERPL-MCNC: 12 MG/DL (ref 6–20)
BUN SERPL-MCNC: 13 MG/DL (ref 6–20)
BUN SERPL-MCNC: 17 MG/DL (ref 6–20)
BUN SERPL-MCNC: 18 MG/DL (ref 6–20)
BUN SERPL-MCNC: 19 MG/DL (ref 6–20)
BUN SERPL-MCNC: 6 MG/DL (ref 6–20)
CALCIUM SERPL-MCNC: 3.4 MG/DL (ref 8.6–10.2)
CALCIUM SERPL-MCNC: 7.2 MG/DL (ref 8.6–10.2)
CALCIUM SERPL-MCNC: 8 MG/DL (ref 8.6–10.2)
CALCIUM SERPL-MCNC: 8.3 MG/DL (ref 8.6–10.2)
CALCIUM SERPL-MCNC: 8.3 MG/DL (ref 8.6–10.2)
CALCIUM SERPL-MCNC: 8.4 MG/DL (ref 8.6–10.2)
CALCIUM SERPL-MCNC: 8.5 MG/DL (ref 8.6–10.2)
CALCIUM SERPL-MCNC: 8.8 MG/DL (ref 8.6–10.2)
CHLORIDE SERPL-SCNC: 115 MMOL/L (ref 98–107)
CHLORIDE SERPL-SCNC: 76 MMOL/L (ref 98–107)
CHLORIDE SERPL-SCNC: 78 MMOL/L (ref 98–107)
CHLORIDE SERPL-SCNC: 82 MMOL/L (ref 98–107)
CHLORIDE SERPL-SCNC: 83 MMOL/L (ref 98–107)
CHLORIDE SERPL-SCNC: 88 MMOL/L (ref 98–107)
CK SERPL-CCNC: 7101 U/L (ref 20–180)
CO2 SERPL-SCNC: 13 MMOL/L (ref 22–29)
CO2 SERPL-SCNC: 25 MMOL/L (ref 22–29)
CO2 SERPL-SCNC: 26 MMOL/L (ref 22–29)
CO2 SERPL-SCNC: 27 MMOL/L (ref 22–29)
CO2 SERPL-SCNC: 28 MMOL/L (ref 22–29)
CO2 SERPL-SCNC: 29 MMOL/L (ref 22–29)
CREAT SERPL-MCNC: 0.1 MG/DL (ref 0.5–1)
CREAT SERPL-MCNC: 0.2 MG/DL (ref 0.5–1)
CREAT SERPL-MCNC: 0.6 MG/DL (ref 0.5–1)
CREAT SERPL-MCNC: 0.7 MG/DL (ref 0.5–1)
CREAT SERPL-MCNC: 0.7 MG/DL (ref 0.5–1)
CREAT SERPL-MCNC: 0.8 MG/DL (ref 0.5–1)
CREAT SERPL-MCNC: 0.9 MG/DL (ref 0.5–1)
CREAT SERPL-MCNC: 0.9 MG/DL (ref 0.5–1)
CREAT UR-MCNC: 160.2 MG/DL (ref 29–226)
ECHO AV AREA PEAK VELOCITY: 1.8 CM2
ECHO AV AREA PEAK VELOCITY: 1.9 CM2
ECHO AV AREA PEAK VELOCITY: 1.9 CM2
ECHO AV AREA PEAK VELOCITY: 2 CM2
ECHO AV AREA VTI: 2.1 CM2
ECHO AV AREA/BSA VTI: 1.2 CM2/M2
ECHO AV CUSP MM: 1.7 CM
ECHO AV MEAN GRADIENT: 9 MMHG
ECHO AV MEAN VELOCITY: 1.4 M/S
ECHO AV PEAK GRADIENT: 15 MMHG
ECHO AV PEAK GRADIENT: 17 MMHG
ECHO AV PEAK VELOCITY: 2 M/S
ECHO AV PEAK VELOCITY: 2.1 M/S
ECHO AV VTI: 35.7 CM
ECHO BSA: 1.81 M2
ECHO EST RA PRESSURE: 3 MMHG
ECHO LA DIAMETER INDEX: 2.2 CM/M2
ECHO LA DIAMETER: 3.9 CM
ECHO LA VOL A-L A2C: 45 ML (ref 22–52)
ECHO LA VOL A-L A4C: 51 ML (ref 22–52)
ECHO LA VOL MOD A2C: 41 ML (ref 22–52)
ECHO LA VOL MOD A4C: 48 ML (ref 22–52)
ECHO LA VOLUME AREA LENGTH: 48 ML
ECHO LA VOLUME INDEX A-L A2C: 25 ML/M2 (ref 16–34)
ECHO LA VOLUME INDEX A-L A4C: 29 ML/M2 (ref 16–34)
ECHO LA VOLUME INDEX AREA LENGTH: 27 ML/M2 (ref 16–34)
ECHO LA VOLUME INDEX MOD A2C: 23 ML/M2 (ref 16–34)
ECHO LA VOLUME INDEX MOD A4C: 27 ML/M2 (ref 16–34)
ECHO LV FRACTIONAL SHORTENING: 43 % (ref 28–44)
ECHO LV INTERNAL DIMENSION DIASTOLE INDEX: 1.98 CM/M2
ECHO LV INTERNAL DIMENSION DIASTOLIC: 3.5 CM (ref 3.9–5.3)
ECHO LV INTERNAL DIMENSION SYSTOLIC INDEX: 1.13 CM/M2
ECHO LV INTERNAL DIMENSION SYSTOLIC: 2 CM
ECHO LV IVSD: 1.8 CM (ref 0.6–0.9)
ECHO LV IVSS: 2.5 CM
ECHO LV MASS 2D: 262.7 G (ref 67–162)
ECHO LV MASS INDEX 2D: 148.4 G/M2 (ref 43–95)
ECHO LV POSTERIOR WALL DIASTOLIC: 1.8 CM (ref 0.6–0.9)
ECHO LV POSTERIOR WALL SYSTOLIC: 2.2 CM
ECHO LV RELATIVE WALL THICKNESS RATIO: 1.03
ECHO LVOT AREA: 3.1 CM2
ECHO LVOT AV VTI INDEX: 0.69
ECHO LVOT DIAM: 2 CM
ECHO LVOT MEAN GRADIENT: 3 MMHG
ECHO LVOT PEAK GRADIENT: 6 MMHG
ECHO LVOT PEAK GRADIENT: 7 MMHG
ECHO LVOT PEAK VELOCITY: 1.3 M/S
ECHO LVOT PEAK VELOCITY: 1.3 M/S
ECHO LVOT STROKE VOLUME INDEX: 44 ML/M2
ECHO LVOT SV: 77.9 ML
ECHO LVOT VTI: 24.8 CM
ECHO MV "A" WAVE DURATION: 161.5 MSEC
ECHO MV A VELOCITY: 0.9 M/S
ECHO MV AREA PHT: 3.1 CM2
ECHO MV AREA VTI: 2.4 CM2
ECHO MV E DECELERATION TIME (DT): 219.4 MS
ECHO MV E VELOCITY: 1.05 M/S
ECHO MV E/A RATIO: 1.17
ECHO MV LVOT VTI INDEX: 1.29
ECHO MV MAX VELOCITY: 1.1 M/S
ECHO MV MEAN GRADIENT: 2 MMHG
ECHO MV MEAN VELOCITY: 0.7 M/S
ECHO MV PEAK GRADIENT: 5 MMHG
ECHO MV PRESSURE HALF TIME (PHT): 70.3 MS
ECHO MV VTI: 32.1 CM
ECHO PV MAX VELOCITY: 1.1 M/S
ECHO PV MEAN GRADIENT: 3 MMHG
ECHO PV MEAN VELOCITY: 0.8 M/S
ECHO PV PEAK GRADIENT: 5 MMHG
ECHO PV VTI: 20.3 CM
ECHO PVEIN A DURATION: 212.2 MS
ECHO PVEIN A VELOCITY: 0.4 M/S
ECHO PVEIN PEAK D VELOCITY: 0.5 M/S
ECHO PVEIN PEAK S VELOCITY: 0.6 M/S
ECHO PVEIN S/D RATIO: 1.2
ECHO RIGHT VENTRICULAR SYSTOLIC PRESSURE (RVSP): 9 MMHG
ECHO RV INTERNAL DIMENSION: 2.5 CM
ECHO RVOT PEAK GRADIENT: 6 MMHG
ECHO RVOT PEAK VELOCITY: 1.2 M/S
ECHO TV REGURGITANT MAX VELOCITY: 1.23 M/S
ECHO TV REGURGITANT PEAK GRADIENT: 6 MMHG
EOSINOPHIL # BLD: 0.01 K/UL (ref 0.05–0.5)
EOSINOPHILS RELATIVE PERCENT: 0 % (ref 0–6)
ERYTHROCYTE [DISTWIDTH] IN BLOOD BY AUTOMATED COUNT: 12.6 % (ref 11.5–15)
GFR SERPL CREATININE-BSD FRML MDRD: >60 ML/MIN/1.73M2
GLUCOSE BLD-MCNC: 103 MG/DL (ref 74–99)
GLUCOSE BLD-MCNC: 118 MG/DL (ref 74–99)
GLUCOSE BLD-MCNC: 135 MG/DL (ref 74–99)
GLUCOSE BLD-MCNC: 230 MG/DL (ref 74–99)
GLUCOSE SERPL-MCNC: 102 MG/DL (ref 74–99)
GLUCOSE SERPL-MCNC: 104 MG/DL (ref 74–99)
GLUCOSE SERPL-MCNC: 105 MG/DL (ref 74–99)
GLUCOSE SERPL-MCNC: 115 MG/DL (ref 74–99)
GLUCOSE SERPL-MCNC: 116 MG/DL (ref 74–99)
GLUCOSE SERPL-MCNC: 125 MG/DL (ref 74–99)
GLUCOSE SERPL-MCNC: 48 MG/DL (ref 74–99)
GLUCOSE SERPL-MCNC: 92 MG/DL (ref 74–99)
HCT VFR BLD AUTO: 28 % (ref 34–48)
HGB BLD-MCNC: 10.4 G/DL (ref 11.5–15.5)
IMM GRANULOCYTES # BLD AUTO: 0.06 K/UL (ref 0–0.58)
IMM GRANULOCYTES NFR BLD: 1 % (ref 0–5)
LYMPHOCYTES NFR BLD: 0.97 K/UL (ref 1.5–4)
LYMPHOCYTES RELATIVE PERCENT: 7 % (ref 20–42)
MCH RBC QN AUTO: 31.8 PG (ref 26–35)
MCHC RBC AUTO-ENTMCNC: 37.1 G/DL (ref 32–34.5)
MCV RBC AUTO: 85.6 FL (ref 80–99.9)
MICROORGANISM SPEC CULT: NO GROWTH
MICROORGANISM SPEC CULT: NORMAL
MONOCYTES NFR BLD: 0.56 K/UL (ref 0.1–0.95)
MONOCYTES NFR BLD: 4 % (ref 2–12)
NEUTROPHILS NFR BLD: 88 % (ref 43–80)
NEUTS SEG NFR BLD: 11.71 K/UL (ref 1.8–7.3)
OSMOLALITY UR: 343 MOSM/KG (ref 300–900)
OSMOLALITY UR: 539 MOSM/KG (ref 300–900)
PLATELET # BLD AUTO: 216 K/UL (ref 130–450)
PMV BLD AUTO: 10.7 FL (ref 7–12)
POTASSIUM SERPL-SCNC: 1.5 MMOL/L (ref 3.5–5)
POTASSIUM SERPL-SCNC: 3.4 MMOL/L (ref 3.5–5)
POTASSIUM SERPL-SCNC: 3.5 MMOL/L (ref 3.5–5)
POTASSIUM SERPL-SCNC: 3.6 MMOL/L (ref 3.5–5)
POTASSIUM SERPL-SCNC: 3.7 MMOL/L (ref 3.5–5)
POTASSIUM SERPL-SCNC: 3.9 MMOL/L (ref 3.5–5)
POTASSIUM, UR: 31.1 MMOL/L
PROT SERPL-MCNC: 6.3 G/DL (ref 6.4–8.3)
RBC # BLD AUTO: 3.27 M/UL (ref 3.5–5.5)
SERVICE CMNT-IMP: NORMAL
SODIUM SERPL-SCNC: 110 MMOL/L (ref 132–146)
SODIUM SERPL-SCNC: 110 MMOL/L (ref 132–146)
SODIUM SERPL-SCNC: 111 MMOL/L (ref 132–146)
SODIUM SERPL-SCNC: 111 MMOL/L (ref 132–146)
SODIUM SERPL-SCNC: 120 MMOL/L (ref 132–146)
SODIUM SERPL-SCNC: 120 MMOL/L (ref 132–146)
SODIUM SERPL-SCNC: 121 MMOL/L (ref 132–146)
SODIUM SERPL-SCNC: 135 MMOL/L (ref 132–146)
SODIUM UR-SCNC: <20 MMOL/L
SODIUM UR-SCNC: <20 MMOL/L
SPECIMEN DESCRIPTION: NORMAL
SPECIMEN DESCRIPTION: NORMAL
T4 FREE SERPL-MCNC: 0.6 NG/DL (ref 0.9–1.7)
TSH SERPL DL<=0.05 MIU/L-ACNC: 43.28 UIU/ML (ref 0.27–4.2)
WBC OTHER # BLD: 13.3 K/UL (ref 4.5–11.5)

## 2023-11-27 PROCEDURE — 6360000002 HC RX W HCPCS: Performed by: INTERNAL MEDICINE

## 2023-11-27 PROCEDURE — 80048 BASIC METABOLIC PNL TOTAL CA: CPT

## 2023-11-27 PROCEDURE — 82962 GLUCOSE BLOOD TEST: CPT

## 2023-11-27 PROCEDURE — 2700000000 HC OXYGEN THERAPY PER DAY

## 2023-11-27 PROCEDURE — 2500000003 HC RX 250 WO HCPCS: Performed by: INTERNAL MEDICINE

## 2023-11-27 PROCEDURE — 83935 ASSAY OF URINE OSMOLALITY: CPT

## 2023-11-27 PROCEDURE — 6370000000 HC RX 637 (ALT 250 FOR IP): Performed by: INTERNAL MEDICINE

## 2023-11-27 PROCEDURE — 84443 ASSAY THYROID STIM HORMONE: CPT

## 2023-11-27 PROCEDURE — 36415 COLL VENOUS BLD VENIPUNCTURE: CPT

## 2023-11-27 PROCEDURE — 71045 X-RAY EXAM CHEST 1 VIEW: CPT

## 2023-11-27 PROCEDURE — 85025 COMPLETE CBC W/AUTO DIFF WBC: CPT

## 2023-11-27 PROCEDURE — 84439 ASSAY OF FREE THYROXINE: CPT

## 2023-11-27 PROCEDURE — 93306 TTE W/DOPPLER COMPLETE: CPT | Performed by: INTERNAL MEDICINE

## 2023-11-27 PROCEDURE — 6360000002 HC RX W HCPCS

## 2023-11-27 PROCEDURE — 82550 ASSAY OF CK (CPK): CPT

## 2023-11-27 PROCEDURE — 93306 TTE W/DOPPLER COMPLETE: CPT

## 2023-11-27 PROCEDURE — 2580000003 HC RX 258: Performed by: INTERNAL MEDICINE

## 2023-11-27 PROCEDURE — 84133 ASSAY OF URINE POTASSIUM: CPT

## 2023-11-27 PROCEDURE — 2580000003 HC RX 258

## 2023-11-27 PROCEDURE — 82570 ASSAY OF URINE CREATININE: CPT

## 2023-11-27 PROCEDURE — 2000000000 HC ICU R&B

## 2023-11-27 PROCEDURE — 84300 ASSAY OF URINE SODIUM: CPT

## 2023-11-27 PROCEDURE — 2500000003 HC RX 250 WO HCPCS

## 2023-11-27 PROCEDURE — 6370000000 HC RX 637 (ALT 250 FOR IP)

## 2023-11-27 PROCEDURE — 02HV33Z INSERTION OF INFUSION DEVICE INTO SUPERIOR VENA CAVA, PERCUTANEOUS APPROACH: ICD-10-PCS | Performed by: INTERNAL MEDICINE

## 2023-11-27 PROCEDURE — 80053 COMPREHEN METABOLIC PANEL: CPT

## 2023-11-27 RX ORDER — POTASSIUM CHLORIDE 29.8 MG/ML
20 INJECTION INTRAVENOUS PRN
Status: DISCONTINUED | OUTPATIENT
Start: 2023-11-27 | End: 2023-11-29 | Stop reason: HOSPADM

## 2023-11-27 RX ORDER — POTASSIUM CHLORIDE 20 MEQ/1
40 TABLET, EXTENDED RELEASE ORAL ONCE
Status: COMPLETED | OUTPATIENT
Start: 2023-11-27 | End: 2023-11-27

## 2023-11-27 RX ORDER — MIDAZOLAM HYDROCHLORIDE 1 MG/ML
2 INJECTION INTRAMUSCULAR; INTRAVENOUS ONCE
Status: COMPLETED | OUTPATIENT
Start: 2023-11-27 | End: 2023-11-27

## 2023-11-27 RX ORDER — SODIUM CHLORIDE 9 MG/ML
INJECTION, SOLUTION INTRAVENOUS CONTINUOUS
Status: DISCONTINUED | OUTPATIENT
Start: 2023-11-27 | End: 2023-11-27

## 2023-11-27 RX ORDER — MIDAZOLAM HYDROCHLORIDE 5 MG/ML
INJECTION INTRAMUSCULAR; INTRAVENOUS
Status: DISPENSED
Start: 2023-11-27 | End: 2023-11-27

## 2023-11-27 RX ORDER — NALOXONE HYDROCHLORIDE 0.4 MG/ML
0.4 INJECTION, SOLUTION INTRAMUSCULAR; INTRAVENOUS; SUBCUTANEOUS ONCE
Status: COMPLETED | OUTPATIENT
Start: 2023-11-27 | End: 2023-11-27

## 2023-11-27 RX ORDER — MIDAZOLAM HYDROCHLORIDE 5 MG/ML
5 INJECTION INTRAMUSCULAR; INTRAVENOUS ONCE
Status: DISCONTINUED | OUTPATIENT
Start: 2023-11-27 | End: 2023-11-27

## 2023-11-27 RX ADMIN — WATER 1000 MG: 1 INJECTION INTRAMUSCULAR; INTRAVENOUS; SUBCUTANEOUS at 08:51

## 2023-11-27 RX ADMIN — LEVOTHYROXINE SODIUM ANHYDROUS 100 MCG: 100 INJECTION, POWDER, LYOPHILIZED, FOR SOLUTION INTRAVENOUS at 06:05

## 2023-11-27 RX ADMIN — SODIUM CHLORIDE 75 ML: 3 INJECTION, SOLUTION INTRAVENOUS at 12:21

## 2023-11-27 RX ADMIN — MIDAZOLAM 2 MG: 1 INJECTION INTRAMUSCULAR; INTRAVENOUS at 11:43

## 2023-11-27 RX ADMIN — Medication 0.5 MCG/KG/HR: at 07:32

## 2023-11-27 RX ADMIN — HYDRALAZINE HYDROCHLORIDE 10 MG: 20 INJECTION, SOLUTION INTRAMUSCULAR; INTRAVENOUS at 09:11

## 2023-11-27 RX ADMIN — POTASSIUM CHLORIDE 20 MEQ: 29.8 INJECTION, SOLUTION INTRAVENOUS at 18:15

## 2023-11-27 RX ADMIN — SODIUM CHLORIDE 1 G: 1 TABLET ORAL at 08:51

## 2023-11-27 RX ADMIN — POTASSIUM BICARBONATE 40 MEQ: 782 TABLET, EFFERVESCENT ORAL at 08:51

## 2023-11-27 RX ADMIN — HYDROCORTISONE SODIUM SUCCINATE 100 MG: 100 INJECTION, POWDER, FOR SOLUTION INTRAMUSCULAR; INTRAVENOUS at 16:01

## 2023-11-27 RX ADMIN — SODIUM CHLORIDE, PRESERVATIVE FREE 10 ML: 5 INJECTION INTRAVENOUS at 20:02

## 2023-11-27 RX ADMIN — SODIUM CHLORIDE, PRESERVATIVE FREE 10 ML: 5 INJECTION INTRAVENOUS at 20:01

## 2023-11-27 RX ADMIN — SODIUM CHLORIDE: 9 INJECTION, SOLUTION INTRAVENOUS at 01:00

## 2023-11-27 RX ADMIN — HYDROCORTISONE SODIUM SUCCINATE 100 MG: 100 INJECTION, POWDER, FOR SOLUTION INTRAMUSCULAR; INTRAVENOUS at 08:51

## 2023-11-27 RX ADMIN — NALXONE HYDROCHLORIDE 0.4 MG: 0.4 INJECTION INTRAMUSCULAR; INTRAVENOUS; SUBCUTANEOUS at 13:09

## 2023-11-27 RX ADMIN — PANTOPRAZOLE SODIUM 40 MG: 40 TABLET, DELAYED RELEASE ORAL at 07:03

## 2023-11-27 RX ADMIN — INSULIN LISPRO 1 UNITS: 100 INJECTION, SOLUTION INTRAVENOUS; SUBCUTANEOUS at 12:15

## 2023-11-27 RX ADMIN — Medication 1.3 MCG/KG/HR: at 16:19

## 2023-11-27 RX ADMIN — POTASSIUM CHLORIDE 40 MEQ: 1500 TABLET, EXTENDED RELEASE ORAL at 07:03

## 2023-11-27 RX ADMIN — Medication 0.7 MCG/KG/HR: at 23:43

## 2023-11-27 RX ADMIN — HYDROCORTISONE SODIUM SUCCINATE 100 MG: 100 INJECTION, POWDER, FOR SOLUTION INTRAMUSCULAR; INTRAVENOUS at 22:44

## 2023-11-27 RX ADMIN — ENOXAPARIN SODIUM 40 MG: 100 INJECTION SUBCUTANEOUS at 08:51

## 2023-11-27 ASSESSMENT — PAIN SCALES - GENERAL: PAINLEVEL_OUTOF10: 0

## 2023-11-27 NOTE — PLAN OF CARE
Problem: Discharge Planning  Goal: Discharge to home or other facility with appropriate resources  11/27/2023 0022 by Sita Manuel RN  Outcome: Progressing  11/26/2023 1934 by Grover Harrison RN  Outcome: Progressing     Problem: Skin/Tissue Integrity  Goal: Absence of new skin breakdown  Description: 1. Monitor for areas of redness and/or skin breakdown  2. Assess vascular access sites hourly  3. Every 4-6 hours minimum:  Change oxygen saturation probe site  4. Every 4-6 hours:  If on nasal continuous positive airway pressure, respiratory therapy assess nares and determine need for appliance change or resting period.   11/27/2023 0022 by Sita Manuel RN  Outcome: Progressing  11/26/2023 1934 by Grover Harrison RN  Outcome: Progressing     Problem: Safety - Adult  Goal: Free from fall injury  11/27/2023 0022 by Sita Manuel RN  Outcome: Progressing  11/26/2023 1934 by Grover Harrison RN  Outcome: Progressing     Problem: Neurosensory - Adult  Goal: Achieves stable or improved neurological status  11/27/2023 0022 by Sita Manuel RN  Outcome: Progressing  11/26/2023 1934 by Grover Harrison RN  Outcome: Progressing  Goal: Absence of seizures  11/27/2023 0022 by Sita Manuel RN  Outcome: Progressing  11/26/2023 1934 by Grover Harrison RN  Outcome: Progressing  Goal: Remains free of injury related to seizures activity  11/27/2023 0022 by Sita Manuel RN  Outcome: Progressing  11/26/2023 1934 by Grover Harrison RN  Outcome: Progressing  Goal: Achieves maximal functionality and self care  11/27/2023 0022 by Sita Manuel RN  Outcome: Progressing  11/26/2023 1934 by Grover Harrison RN  Outcome: Progressing     Problem: Respiratory - Adult  Goal: Achieves optimal ventilation and oxygenation  11/27/2023 0022 by Sita Manuel RN  Outcome: Progressing  11/26/2023 1934 by Grover Harrison RN  Outcome: Progressing     Problem: Cardiovascular - Adult  Goal: Maintains optimal cardiac output and hemodynamic stability  11/27/2023 0022 by David Rascon RN  Outcome: Progressing  11/26/2023 1934 by Kely Pena RN  Outcome: Progressing     Problem: Skin/Tissue Integrity - Adult  Goal: Skin integrity remains intact  11/27/2023 0022 by David Rascon RN  Outcome: Progressing  11/26/2023 1934 by Kely Pena RN  Outcome: Progressing  Goal: Oral mucous membranes remain intact  11/27/2023 0022 by David Rascon RN  Outcome: Progressing  11/26/2023 1934 by Kely Pena RN  Outcome: Progressing     Problem: Musculoskeletal - Adult  Goal: Return mobility to safest level of function  11/27/2023 0022 by David Rascon RN  Outcome: Progressing  11/26/2023 1934 by Kely Pena RN  Outcome: Progressing     Problem: Genitourinary - Adult  Goal: Absence of urinary retention  11/27/2023 0022 by David Rascon RN  Outcome: Progressing  11/26/2023 1934 by Kely Pena RN  Outcome: Progressing     Problem: Metabolic/Fluid and Electrolytes - Adult  Goal: Electrolytes maintained within normal limits  11/27/2023 0022 by David Rascon RN  Outcome: Progressing  11/26/2023 1934 by Kely Pena RN  Outcome: Progressing  Goal: Hemodynamic stability and optimal renal function maintained  11/27/2023 0022 by David Rascon RN  Outcome: Progressing  11/26/2023 1934 by Kely Pena RN  Outcome: Progressing

## 2023-11-27 NOTE — CONSULTS
[address]                    [address] [facility],[address] [place], [address] [facility]                                  CONSULTATION    PATIENT NAME: Sabrina Christine                     :        1965  MED REC NO:   [id number]                            ROOM:       [facility]  ACCOUNT NO:   [de-identified]                           ADMIT DATE: 2023  PROVIDER:     Jo Ann Feliciano MD    CONSULT DATE:  2023    ADMITTING PROVIDER:  Dr. Isabelle Duke. REASON FOR CONSULTATION:  Hyponatremia and hypokalemia. HISTORY OF PRESENT ILLNESS:  The patient is being seen in consultation  at the request of Dr. Isabelle Duke. The patient is a 57-year-old lady who  presented to the emergency room at [facility]  with the chief complaints of altered mental status. Apparently, the  patient's  found the patient to be not acting herself after he  came home last evening and this was an acute change. The patient is a  poor historian. No family member is presently available to corroborate  any history. She does admit to her liberal fluid intake. When seen in  the ICU, the patient only requested that she needs to be given water. Only requested \"I need a drink. \"  Upon presentation, the patient was  found to have a serum sodium of 109 mmol/L. We do not have any recent  baseline serum sodium available for comparison. The last available  sodium is from almost nine years ago on 2015 when it was 142  mmol/L. Because of the mental status, the patient was given hypertonic  saline and has subsequently been hydrated, but the serum sodium has gone  up to 111 mmol/L followed by serum sodium 110 mmol/L. There is an  isolated reading of 117 mmol/L at 2:43 a.m., and I am not sure if that  is accurate or if it was drawn close to the _____ as the serum sodium  next one is back again to 110. She was also found to be profoundly  hypokalemic with a serum potassium of 2.9 mmol/L.   When seen in patient has received a  dose of hypertonic saline at 75 mL. The patient also has been on IV  fluids of normal saline at 100 mL an hour. In addition, the patient is  on Rocephin 1 gm IV every 24 hours, Lovenox 40 mg subcutaneously daily,  hydrocortisone 100 mg given intravenously once and 100 mg every 8 hours,  levothyroxine 50 mcg intravenously every 12 hours, pantoprazole 40 mg  orally daily. The patient has received multiple potassium supplements. The patient is on Precedex infusion. The patient was getting normal  saline at 100 mL an hour. REVIEW OF SYSTEMS:  The patient unable to provide a review of systems. No other family member is presently available. PHYSICAL EXAMINATION:  GENERAL:  The patient is awake, able to follow simple commands, but not  oriented to place or person. VITAL SIGNS:  Blood pressure is 186/108, pulse is 78, respiratory rate  is 14, and temperature 97.2 degrees Fahrenheit. HEENT:  Head is normocephalic and atraumatic. Pupils are equal and  reactive to light, unable to assess for accommodation. Fundus  examination is deferred. Mouth and throat, Moist oral mucosa. No  mucosal ulcerations or exudates. NECK:  Neck is supple. No distention. No carotid bruits. No palpable  masses. CHEST:  Symmetrical.  LUNGS:  Vesicular breath sounds. Breath sounds are decreased at the  bases with few scattered end-expiratory rhonchi. HEART:  Regular rate and rhythm without any pericardial rub or gallop. ABDOMEN:  Abdomen is soft, nontender. No rebound tenderness. No  palpable masses. EXTREMITIES:  The patient has +1 bipedal edema. LABORATORY DATA:  Lab data upon presentation; sodium 109 mmol/L,  potassium 2.9 mmol/L, chloride 71 mmol/L, CO2 at 25 mmol/L, BUN 13  mg/dL, creatinine 0.8 mg/dL, with total CPK of 7505, albumin of 5.2  gm/dL, glucose was 132 mg/dL, total TSH was 87.28 micro units per  milliliter with an T4 level of less than 0.4 mcg/dL.   Urine toxicity  screen was

## 2023-11-27 NOTE — PROGRESS NOTES
her current condition. Musculoskeletal:   Spine ROM normal. Muscular strength intact. Gait not assessed. Integumentary:  No rashes  Skin normal color and texture. Genitalia/Breast:  Voiding with use of a catheter.     Medication:  Scheduled Meds:   hydrocortisone sodium succinate PF  100 mg IntraVENous Q8H    sodium chloride flush  5-40 mL IntraVENous 2 times per day    enoxaparin  40 mg SubCUTAneous Daily    pantoprazole  40 mg Oral QAM AC    cefTRIAXone (ROCEPHIN) IV  1,000 mg IntraVENous Q24H    cloNIDine  1 patch TransDERmal Weekly    levothyroxine  100 mcg IntraVENous Daily    insulin lispro  0-4 Units SubCUTAneous TID WC    insulin lispro  0-4 Units SubCUTAneous Nightly    sodium chloride  1 g Oral TID    sodium chloride flush  5-40 mL IntraVENous 2 times per day     Continuous Infusions:   sodium chloride 100 mL/hr at 11/27/23 0610    dextrose      sodium chloride      dexmedetomidine HCl in NaCl 0.5 mcg/kg/hr (11/27/23 0732)    sodium chloride         Objective Data:  Recent Labs     11/25/23 2109 11/26/23  0738 11/27/23  0619   WBC 12.4* 14.1* 13.3*   RBC 4.59 3.77 3.27*   HGB 14.4 11.8 10.4*   HCT 38.6 31.5* 28.0*   MCV 84.1 83.6 85.6   MCH 31.4 31.3 31.8   MCHC 37.3* 37.5* 37.1*   RDW 12.4 12.8 12.6   PLT  --   --  216   MPV 10.9 10.6 10.7     Recent Labs     11/25/23  2109 11/26/23  0243 11/26/23  0610 11/26/23  0959 11/27/23  0301 11/27/23  0426 11/27/23  0619   *   < > 111*   < > 135 111* 111*   K 2.9*   < > 3.7   < > 1.5* 3.4* 3.4*   CL 71*   < > 79*   < > 115* 78* 78*   CO2 25   < > 24   < > 13* 25 26   BUN 13   < > 14   < > 6 12 13   CREATININE 0.8   < > 0.7   < > 0.2* 0.6 0.7   GLUCOSE 132*   < > 109*   < > 48* 105* 115*   CALCIUM 9.5   < > 8.4*   < > 3.4* 8.3* 8.5*   PROT 7.8  --  6.1*  --   --   --  6.3*   LABALBU 5.2  --  4.2  --   --   --  4.0   BILITOT 0.4  --  0.4  --   --   --  0.3   ALKPHOS 140*  --  102  --   --   --  109*   *  --  151*  --   --   --  197*   ALT 42*  -- 36*  --   --   --  46*    < > = values in this interval not displayed. Assessment:  Multifactorial encephalopathy  Hyperosmolar, hypovolemic, hyponatremia that is multifactorial in nature  Profound hypothyroidism with myxedema  Sepsis secondary to a urinary tract infection  Rhabdomyolysis with contusion to the upper chest  Carotid stenosis  Gastroesophageal reflux disease  Sleep apnea  Essential hypertension  Current tobacco abuse  Bipolar disorder on multiple psychiatric medications  Elevated troponin suggesting demand ischemia  Elevated transaminases    Plan:   Primary management as per the critical care team.  Sodium and electrolyte derangements are being addressed with the assistance of the nephrology team.  She is maintained on antibiotics as we await final cultures. Hypertonic saline has been discontinued and she has been transitioned to normal saline. Precedex infusion is being administered in the setting of agitation. No family members were present during my examination. Noncompliance appears to be a significant issues that we will need to be addressed moving forward. We appreciate the input from the multiple subspecialist providing care. Greater than 40 minutes of critical care time was spent with the patient. This includes chart review, , and discussion with those consultants involved in the patient's care. More than 50% of my  time was spent at the bedside counseling/coordinating care with the patient and/or family with face to face contact. This time was spent reviewing notes and laboratory data as well as instructing and counseling the patient. Time I spent with the family or surrogate(s) is included only if the patient was incapable of providing the necessary information or participating in medical decisions. I also discussed the differential diagnosis and all of the proposed management plans with the patient and individuals accompanying the patient.     Julito Bajwa, ,

## 2023-11-27 NOTE — ACP (ADVANCE CARE PLANNING)
Advance Care Planning   Healthcare Decision Maker:    Primary Decision Maker: Donis Campbell Spouse - [phone]    Secondary Decision Maker: Lorelei Garza - Child - [phone]    Today we documented Decision Maker(s) consistent with Legal Next of Kin hierarchy.

## 2023-11-27 NOTE — PROGRESS NOTES
Speech Language Pathology  NAME:  Danielle Smith  :  1965  DATE: 2023  [facility]    SLP eval and treat  Start:  23 1145,   End:  23 1145,   ONE TIME,   Standing Count:  1 Occurrences,   Erin Bah MD     Pt unavailable at 23 for Clinical Swallow Evaluation services due to:    HOLD per RN, Pt not medically appropriate yet     Will re-attempt as appropriate. Thank you.      Irma Torres MSCCC/SLP  Speech Language Pathologist  [id number]

## 2023-11-27 NOTE — PLAN OF CARE
Problem: Discharge Planning  Goal: Discharge to home or other facility with appropriate resources  11/27/2023 1106 by Gerard Singh RN  Outcome: Progressing     Problem: Skin/Tissue Integrity  Goal: Absence of new skin breakdown  Description: 1. Monitor for areas of redness and/or skin breakdown  2. Assess vascular access sites hourly  3. Every 4-6 hours minimum:  Change oxygen saturation probe site  4. Every 4-6 hours:  If on nasal continuous positive airway pressure, respiratory therapy assess nares and determine need for appliance change or resting period.   11/27/2023 1106 by Gerard Singh RN  Outcome: Progressing     Problem: Safety - Adult  Goal: Free from fall injury  11/27/2023 1106 by Gerard Singh RN  Outcome: Progressing     Problem: Neurosensory - Adult  Goal: Achieves stable or improved neurological status  11/27/2023 1106 by Gerard Singh RN  Outcome: Progressing     Problem: Neurosensory - Adult  Goal: Absence of seizures  11/27/2023 1106 by Gerard Singh RN  Outcome: Progressing     Problem: Neurosensory - Adult  Goal: Remains free of injury related to seizures activity  11/27/2023 1106 by Gerard Singh RN  Outcome: Progressing     Problem: Neurosensory - Adult  Goal: Achieves maximal functionality and self care  11/27/2023 1106 by Gerard Singh RN  Outcome: Progressing     Problem: Respiratory - Adult  Goal: Achieves optimal ventilation and oxygenation  11/27/2023 1106 by Gerard Singh RN  Outcome: Progressing     Problem: Cardiovascular - Adult  Goal: Maintains optimal cardiac output and hemodynamic stability  11/27/2023 1106 by Gerard Singh RN  Outcome: Progressing     Problem: Skin/Tissue Integrity - Adult  Goal: Skin integrity remains intact  11/27/2023 1106 by Gerard Singh RN  Outcome: Progressing  Flowsheets (Taken 11/27/2023 1105)  Skin Integrity Remains Intact:   Assess vascular access sites hourly   Monitor for areas of redness and/or

## 2023-11-27 NOTE — PLAN OF CARE
Problem: Discharge Planning  Goal: Discharge to home or other facility with appropriate resources  Outcome: Progressing     Problem: Skin/Tissue Integrity  Goal: Absence of new skin breakdown  Description: 1. Monitor for areas of redness and/or skin breakdown  2. Assess vascular access sites hourly  3. Every 4-6 hours minimum:  Change oxygen saturation probe site  4. Every 4-6 hours:  If on nasal continuous positive airway pressure, respiratory therapy assess nares and determine need for appliance change or resting period.   Outcome: Progressing     Problem: Safety - Adult  Goal: Free from fall injury  Outcome: Progressing     Problem: Neurosensory - Adult  Goal: Achieves stable or improved neurological status  Outcome: Progressing     Problem: Neurosensory - Adult  Goal: Absence of seizures  Outcome: Progressing     Problem: Neurosensory - Adult  Goal: Remains free of injury related to seizures activity  Outcome: Progressing     Problem: Neurosensory - Adult  Goal: Achieves maximal functionality and self care  Outcome: Progressing     Problem: Respiratory - Adult  Goal: Achieves optimal ventilation and oxygenation  Outcome: Progressing     Problem: Cardiovascular - Adult  Goal: Maintains optimal cardiac output and hemodynamic stability  Outcome: Progressing     Problem: Skin/Tissue Integrity - Adult  Goal: Skin integrity remains intact  Outcome: Progressing     Problem: Skin/Tissue Integrity - Adult  Goal: Oral mucous membranes remain intact  Outcome: Progressing     Problem: Musculoskeletal - Adult  Goal: Return mobility to safest level of function  Outcome: Progressing     Problem: Genitourinary - Adult  Goal: Absence of urinary retention  Outcome: Progressing     Problem: Metabolic/Fluid and Electrolytes - Adult  Goal: Electrolytes maintained within normal limits  Outcome: Progressing     Problem: Metabolic/Fluid and Electrolytes - Adult  Goal: Hemodynamic stability and optimal renal function maintained  Outcome: Progressing

## 2023-11-27 NOTE — CARE COORDINATION
Case Management Assessment  Initial Evaluation    Date/Time of Evaluation: 11/27/2023 1:20 PM  Assessment Completed by: Luis Ragsdale RN    If patient is discharged prior to next notation, then this note serves as note for discharge by case management. Patient Name: Danielle [facility] [address] [place] [address]                   YOB: 1965  Diagnosis: Hypokalemia [E87.6]  Hypochloremia [E87.8]  Hyponatremia [E87.1]  Non-traumatic rhabdomyolysis [M62.82]  Altered mental status, unspecified altered mental status type [R41.82]  Hypothyroidism, unspecified type [E03.9]                   Date / Time: 11/25/2023  8:55 PM    Patient Admission Status: Inpatient   Readmission Risk (Low < 19, Mod (19-27), High > 27): Readmission Risk Score: 13.8    Current PCP: Marsha Pagan, DO  PCP verified by CM? Yes    Chart Reviewed: Yes      History Provided by: Spouse  Patient Orientation: Unresponsive    Patient Cognition: Other (see comment) (medically unable to assess)    Hospitalization in the last 30 days (Readmission):  No    If yes, Readmission Assessment in CM Navigator will be completed. Advance Directives:      Code Status: Full Code   Patient's Primary Decision Maker is: Legal Next of Kin    Primary Decision Maker: Gabriel Smith - Spouse - [phone]    Secondary Decision Maker: Charly Adams - Child - [phone]    Discharge Planning:    Patient lives with: Spouse/Significant Other Type of Home: House  Primary Care Giver: Self  Patient Support Systems include: Spouse/Significant Other, Children     ADLS  Prior functional level: Independent in ADLs/IADLs  Current functional level: Assistance with the following:, Other (see comment) (unable to assess)      Family can provide assistance at DC: Yes  Would you like Case Management to discuss the discharge plan with any other family members/significant others, and if so, who?  Yes ( gabriel)  Plans to Return to Present Housing: Unknown at present  Other Identified

## 2023-11-27 NOTE — PROCEDURES
() Central Line Insertion Procedure       (x ) Hemodialysis Catheter Insertion Procedure       11/27/23   Time: 12:24 PM    Patient Name: Sarah Gabriel   Medical Record Number: [id number]  Date: 11/27/2023     Time: 12:24 PM    Room/Bed: [place] [address]-01    Risks-benefits  : Risks-benefits and alternatives described and explained to patient and or POA Or family. .  Informed consent : patient was and legal guardian was,treatment, risks, benefits and alternatives explained and questions answered, written instruction given, verbalized understanding of instructions, and consent obtained, The patient provided verbal consent for this procedure., Informed consent recorded in Epic Verbal consent obtained. The legal guardian was counseled regarding the procedure via phone (confirmed by third party present), it's indications, risks, potential complications and alternatives and any questions were answered. Verbal consent was obtained. Time In/Out  :          KIT Used  : A triple lumen catheter      Number of Kits used: 1    Pre-procedure Dx  :  as above    Indications  : vascular access, poor peripheral access, hypovolemia, long term access, centrally administered medications, and need for frequent blood draws. Performed By  : Sean Almaguer MD, Self. Ultrasound Guidance: used. Sterile barriers : All five maximum sterile barriers used - cap, mask, sterile gown, sterile gloves, and large sterile sheet. Patient position : Supine    Technique:   Procedure was done using strict aseptic technique. The patient was positioned appropriately and the skin over the right internal jugular vein was prepped with betadine and draped in a sterile fashion and draped in a sterile fashion. Local anesthesia was obtained by infiltration using 1% Lidocaine without epinephrine. A large bore needle was used to identify the vein. A guide wire was then inserted into the vein through the needle.  A triple lumen catheter was then inserted into the vessel over the guide wire using the Seldinger technique. All ports showed good, free flowing blood return and were flushed with saline solution. The catheter was then securely fastened to the skin with sutures and covered with a sterile dressing. The patient tolerated the procedure well. Pull back amount :                                                                                                  Number of Attempts : {NUMBER 1-10:1    Post-procedure CXR Findings: was ordered and showed good line position    Complications : None     Estimated blood loss: About 2 ml. Jessa Patricio MD   Pulmonary & Critical Care Medicine  11/27/23   Time: 12:24 PM        Total Time: 60  Minutes    Attestation     : I performed the procedure.     Electronically Signed by: Jessa Patricio MD., [facility]

## 2023-11-27 NOTE — PROGRESS NOTES
Upon 1600 assessment, Central Line not drawing any blood, stiches holding line in place not intact and coming undone, Dr. Claudine Gaucher notified and stated will come to assess line himself.

## 2023-11-28 ENCOUNTER — APPOINTMENT (OUTPATIENT)
Dept: GENERAL RADIOLOGY | Age: 58
DRG: 871 | End: 2023-11-28

## 2023-11-28 ENCOUNTER — APPOINTMENT (OUTPATIENT)
Dept: CT IMAGING | Age: 58
DRG: 871 | End: 2023-11-28

## 2023-11-28 PROBLEM — A41.9 SEPTIC SHOCK (HCC): Status: ACTIVE | Noted: 2023-11-28

## 2023-11-28 PROBLEM — R65.21 SEPTIC SHOCK (HCC): Status: ACTIVE | Noted: 2023-11-28

## 2023-11-28 LAB
AADO2: 187 MMHG
AADO2: 321.2 MMHG
ALBUMIN SERPL-MCNC: 4 G/DL (ref 3.5–5.2)
ALBUMIN SERPL-MCNC: 4.1 G/DL (ref 3.5–5.2)
ALP SERPL-CCNC: 115 U/L (ref 35–104)
ALP SERPL-CCNC: 128 U/L (ref 35–104)
ALT SERPL-CCNC: 106 U/L (ref 0–32)
ALT SERPL-CCNC: 87 U/L (ref 0–32)
ANION GAP SERPL CALCULATED.3IONS-SCNC: 10 MMOL/L (ref 7–16)
ANION GAP SERPL CALCULATED.3IONS-SCNC: 14 MMOL/L (ref 7–16)
ANION GAP SERPL CALCULATED.3IONS-SCNC: 7 MMOL/L (ref 7–16)
ANION GAP SERPL CALCULATED.3IONS-SCNC: 9 MMOL/L (ref 7–16)
AST SERPL-CCNC: 239 U/L (ref 0–31)
AST SERPL-CCNC: 244 U/L (ref 0–31)
B.E.: -2.8 MMOL/L (ref -3–3)
B.E.: -3.5 MMOL/L (ref -3–3)
B.E.: -5.4 MMOL/L (ref -3–3)
BASOPHILS # BLD: 0.01 K/UL (ref 0–0.2)
BASOPHILS # BLD: 0.01 K/UL (ref 0–0.2)
BASOPHILS NFR BLD: 0 % (ref 0–2)
BASOPHILS NFR BLD: 0 % (ref 0–2)
BILIRUB SERPL-MCNC: 0.3 MG/DL (ref 0–1.2)
BILIRUB SERPL-MCNC: 0.5 MG/DL (ref 0–1.2)
BNP SERPL-MCNC: ABNORMAL PG/ML (ref 0–450)
BUN SERPL-MCNC: 20 MG/DL (ref 6–20)
BUN SERPL-MCNC: 22 MG/DL (ref 6–20)
BUN SERPL-MCNC: 23 MG/DL (ref 6–20)
BUN SERPL-MCNC: 24 MG/DL (ref 6–20)
BUN SERPL-MCNC: 25 MG/DL (ref 6–20)
BUN SERPL-MCNC: 29 MG/DL (ref 6–20)
CALCIUM SERPL-MCNC: 8.1 MG/DL (ref 8.6–10.2)
CALCIUM SERPL-MCNC: 8.1 MG/DL (ref 8.6–10.2)
CALCIUM SERPL-MCNC: 8.2 MG/DL (ref 8.6–10.2)
CALCIUM SERPL-MCNC: 8.3 MG/DL (ref 8.6–10.2)
CALCIUM SERPL-MCNC: 8.5 MG/DL (ref 8.6–10.2)
CALCIUM SERPL-MCNC: 8.6 MG/DL (ref 8.6–10.2)
CALCIUM UR-MCNC: 3.7 MG/DL
CHLORIDE SERPL-SCNC: 83 MMOL/L (ref 98–107)
CHLORIDE SERPL-SCNC: 84 MMOL/L (ref 98–107)
CHLORIDE SERPL-SCNC: 85 MMOL/L (ref 98–107)
CHLORIDE UR-SCNC: <20 MMOL/L
CK SERPL-CCNC: 4796 U/L (ref 20–180)
CK SERPL-CCNC: 6178 U/L (ref 20–180)
CO2 SERPL-SCNC: 23 MMOL/L (ref 22–29)
CO2 SERPL-SCNC: 25 MMOL/L (ref 22–29)
CO2 SERPL-SCNC: 26 MMOL/L (ref 22–29)
CO2 SERPL-SCNC: 27 MMOL/L (ref 22–29)
CO2 SERPL-SCNC: 28 MMOL/L (ref 22–29)
CO2 SERPL-SCNC: 28 MMOL/L (ref 22–29)
COHB: 0.2 % (ref 0–1.5)
COHB: 0.3 % (ref 0–1.5)
COHB: 0.8 % (ref 0–1.5)
COMMENT: ABNORMAL
CREAT SERPL-MCNC: 0.9 MG/DL (ref 0.5–1)
CREAT SERPL-MCNC: 1 MG/DL (ref 0.5–1)
CREAT SERPL-MCNC: 1.4 MG/DL (ref 0.5–1)
CRITICAL: ABNORMAL
DATE ANALYZED: ABNORMAL
DATE OF COLLECTION: ABNORMAL
EOSINOPHIL # BLD: 0 K/UL (ref 0.05–0.5)
EOSINOPHIL # BLD: 0.01 K/UL (ref 0.05–0.5)
EOSINOPHILS RELATIVE PERCENT: 0 % (ref 0–6)
EOSINOPHILS RELATIVE PERCENT: 0 % (ref 0–6)
ERYTHROCYTE [DISTWIDTH] IN BLOOD BY AUTOMATED COUNT: 13.3 % (ref 11.5–15)
ERYTHROCYTE [DISTWIDTH] IN BLOOD BY AUTOMATED COUNT: 13.4 % (ref 11.5–15)
FIO2: 100 %
FIO2: 100 %
GFR SERPL CREATININE-BSD FRML MDRD: 45 ML/MIN/1.73M2
GFR SERPL CREATININE-BSD FRML MDRD: >60 ML/MIN/1.73M2
GLUCOSE BLD-MCNC: 125 MG/DL (ref 74–99)
GLUCOSE BLD-MCNC: 131 MG/DL (ref 74–99)
GLUCOSE SERPL-MCNC: 106 MG/DL (ref 74–99)
GLUCOSE SERPL-MCNC: 110 MG/DL (ref 74–99)
GLUCOSE SERPL-MCNC: 110 MG/DL (ref 74–99)
GLUCOSE SERPL-MCNC: 122 MG/DL (ref 74–99)
GLUCOSE SERPL-MCNC: 150 MG/DL (ref 74–99)
GLUCOSE SERPL-MCNC: 152 MG/DL (ref 74–99)
HCO3: 19.3 MMOL/L (ref 22–26)
HCO3: 23.2 MMOL/L (ref 22–26)
HCO3: 31.4 MMOL/L (ref 22–26)
HCT VFR BLD AUTO: 25 % (ref 34–48)
HCT VFR BLD AUTO: 26.1 % (ref 34–48)
HGB BLD-MCNC: 8.9 G/DL (ref 11.5–15.5)
HGB BLD-MCNC: 9 G/DL (ref 11.5–15.5)
HHB: 0.2 % (ref 0–5)
HHB: 0.3 % (ref 0–5)
HHB: 25.7 % (ref 0–5)
IMM GRANULOCYTES # BLD AUTO: 0.03 K/UL (ref 0–0.58)
IMM GRANULOCYTES # BLD AUTO: 0.05 K/UL (ref 0–0.58)
IMM GRANULOCYTES NFR BLD: 0 % (ref 0–5)
IMM GRANULOCYTES NFR BLD: 1 % (ref 0–5)
INR PPP: 1.1
LAB: ABNORMAL
LACTATE BLDV-SCNC: 2.1 MMOL/L (ref 0.5–2.2)
LYMPHOCYTES NFR BLD: 0.43 K/UL (ref 1.5–4)
LYMPHOCYTES NFR BLD: 0.65 K/UL (ref 1.5–4)
LYMPHOCYTES RELATIVE PERCENT: 10 % (ref 20–42)
LYMPHOCYTES RELATIVE PERCENT: 6 % (ref 20–42)
Lab: 1630
Lab: 1802
Lab: 2020
MCH RBC QN AUTO: 30.7 PG (ref 26–35)
MCH RBC QN AUTO: 31.9 PG (ref 26–35)
MCHC RBC AUTO-ENTMCNC: 34.5 G/DL (ref 32–34.5)
MCHC RBC AUTO-ENTMCNC: 35.6 G/DL (ref 32–34.5)
MCV RBC AUTO: 89.1 FL (ref 80–99.9)
MCV RBC AUTO: 89.6 FL (ref 80–99.9)
METHB: 0.3 % (ref 0–1.5)
MODE: ABNORMAL
MODE: AC
MODE: AC
MONOCYTES NFR BLD: 0.06 K/UL (ref 0.1–0.95)
MONOCYTES NFR BLD: 1 % (ref 2–12)
MONOCYTES NFR BLD: 1.05 K/UL (ref 0.1–0.95)
MONOCYTES NFR BLD: 10 % (ref 2–12)
NEUTROPHILS NFR BLD: 83 % (ref 43–80)
NEUTROPHILS NFR BLD: 87 % (ref 43–80)
NEUTS SEG NFR BLD: 3.82 K/UL (ref 1.8–7.3)
NEUTS SEG NFR BLD: 8.57 K/UL (ref 1.8–7.3)
O2 CONTENT: 11.2 ML/DL
O2 CONTENT: 14.2 ML/DL
O2 CONTENT: 14.6 ML/DL
O2 SATURATION: 74 % (ref 92–98.5)
O2 SATURATION: 99.7 % (ref 92–98.5)
O2 SATURATION: 99.8 % (ref 92–98.5)
O2HB: 73.2 % (ref 94–97)
O2HB: 99.1 % (ref 94–97)
O2HB: 99.3 % (ref 94–97)
OPERATOR ID: ABNORMAL
OSMOLALITY UR: 358 MOSM/KG (ref 300–900)
OSMOLALITY UR: 375 MOSM/KG (ref 300–900)
PATIENT TEMP: 37 C
PCO2: 149.8 MMHG (ref 35–45)
PCO2: 34.5 MMHG (ref 35–45)
PCO2: 45.7 MMHG (ref 35–45)
PEEP/CPAP: 5 CMH2O
PEEP/CPAP: 5 CMH2O
PFO2: 3.21 MMHG/%
PFO2: 4.66 MMHG/%
PH BLOOD GAS: 6.94 (ref 7.35–7.45)
PH BLOOD GAS: 7.32 (ref 7.35–7.45)
PH BLOOD GAS: 7.37 (ref 7.35–7.45)
PHOSPHATE SERPL-MCNC: 5.3 MG/DL (ref 2.5–4.5)
PLATELET # BLD AUTO: 157 K/UL (ref 130–450)
PLATELET # BLD AUTO: 196 K/UL (ref 130–450)
PMV BLD AUTO: 10.6 FL (ref 7–12)
PMV BLD AUTO: 11.1 FL (ref 7–12)
PO2: 321.1 MMHG (ref 75–100)
PO2: 466.5 MMHG (ref 75–100)
PO2: 48.8 MMHG (ref 75–100)
POTASSIUM SERPL-SCNC: 4.2 MMOL/L (ref 3.5–5)
POTASSIUM SERPL-SCNC: 4.2 MMOL/L (ref 3.5–5)
POTASSIUM SERPL-SCNC: 4.3 MMOL/L (ref 3.5–5)
POTASSIUM SERPL-SCNC: 4.3 MMOL/L (ref 3.5–5)
POTASSIUM SERPL-SCNC: 4.6 MMOL/L (ref 3.5–5)
POTASSIUM SERPL-SCNC: 4.6 MMOL/L (ref 3.5–5)
POTASSIUM, UR: 28.9 MMOL/L
PROT SERPL-MCNC: 5.7 G/DL (ref 6.4–8.3)
PROT SERPL-MCNC: 6.2 G/DL (ref 6.4–8.3)
PROTHROMBIN TIME: 11.9 SEC (ref 9.3–12.4)
RBC # BLD AUTO: 2.79 M/UL (ref 3.5–5.5)
RBC # BLD AUTO: 2.93 M/UL (ref 3.5–5.5)
RI(T): 0.4
RI(T): 1
RR MECHANICAL: 24 B/MIN
RR MECHANICAL: 24 B/MIN
SODIUM SERPL-SCNC: 118 MMOL/L (ref 132–146)
SODIUM SERPL-SCNC: 119 MMOL/L (ref 132–146)
SODIUM SERPL-SCNC: 119 MMOL/L (ref 132–146)
SODIUM SERPL-SCNC: 120 MMOL/L (ref 132–146)
SODIUM SERPL-SCNC: 121 MMOL/L (ref 132–146)
SODIUM SERPL-SCNC: 122 MMOL/L (ref 132–146)
SODIUM UR-SCNC: <20 MMOL/L
SODIUM UR-SCNC: <20 MMOL/L
SOURCE, BLOOD GAS: ABNORMAL
T4 FREE SERPL-MCNC: 0.2 NG/DL (ref 0.9–1.7)
THB: 10.9 G/DL (ref 11.5–16.5)
THB: 9.5 G/DL (ref 11.5–16.5)
THB: 9.6 G/DL (ref 11.5–16.5)
TIME ANALYZED: 1639
TIME ANALYZED: 1807
TIME ANALYZED: 2030
TROPONIN I SERPL HS-MCNC: 444 NG/L (ref 0–9)
TSH SERPL DL<=0.05 MIU/L-ACNC: 38.94 UIU/ML (ref 0.27–4.2)
VT MECHANICAL: 400 ML
VT MECHANICAL: 400 ML
WBC OTHER # BLD: 10.3 K/UL (ref 4.5–11.5)
WBC OTHER # BLD: 4.4 K/UL (ref 4.5–11.5)

## 2023-11-28 PROCEDURE — 2700000000 HC OXYGEN THERAPY PER DAY

## 2023-11-28 PROCEDURE — 2500000003 HC RX 250 WO HCPCS: Performed by: INTERNAL MEDICINE

## 2023-11-28 PROCEDURE — 94640 AIRWAY INHALATION TREATMENT: CPT

## 2023-11-28 PROCEDURE — 82340 ASSAY OF CALCIUM IN URINE: CPT

## 2023-11-28 PROCEDURE — 2500000003 HC RX 250 WO HCPCS

## 2023-11-28 PROCEDURE — 2580000003 HC RX 258: Performed by: INTERNAL MEDICINE

## 2023-11-28 PROCEDURE — 6360000002 HC RX W HCPCS: Performed by: INTERNAL MEDICINE

## 2023-11-28 PROCEDURE — 36620 INSERTION CATHETER ARTERY: CPT | Performed by: INTERNAL MEDICINE

## 2023-11-28 PROCEDURE — 6360000002 HC RX W HCPCS

## 2023-11-28 PROCEDURE — 84588 ASSAY OF VASOPRESSIN: CPT

## 2023-11-28 PROCEDURE — C9113 INJ PANTOPRAZOLE SODIUM, VIA: HCPCS | Performed by: STUDENT IN AN ORGANIZED HEALTH CARE EDUCATION/TRAINING PROGRAM

## 2023-11-28 PROCEDURE — 74177 CT ABD & PELVIS W/CONTRAST: CPT

## 2023-11-28 PROCEDURE — 31500 INSERT EMERGENCY AIRWAY: CPT

## 2023-11-28 PROCEDURE — 92610 EVALUATE SWALLOWING FUNCTION: CPT | Performed by: SPEECH-LANGUAGE PATHOLOGIST

## 2023-11-28 PROCEDURE — 6370000000 HC RX 637 (ALT 250 FOR IP)

## 2023-11-28 PROCEDURE — 83880 ASSAY OF NATRIURETIC PEPTIDE: CPT

## 2023-11-28 PROCEDURE — 80053 COMPREHEN METABOLIC PANEL: CPT

## 2023-11-28 PROCEDURE — 94664 DEMO&/EVAL PT USE INHALER: CPT

## 2023-11-28 PROCEDURE — 5A1945Z RESPIRATORY VENTILATION, 24-96 CONSECUTIVE HOURS: ICD-10-PCS | Performed by: INTERNAL MEDICINE

## 2023-11-28 PROCEDURE — 6360000002 HC RX W HCPCS: Performed by: STUDENT IN AN ORGANIZED HEALTH CARE EDUCATION/TRAINING PROGRAM

## 2023-11-28 PROCEDURE — 36415 COLL VENOUS BLD VENIPUNCTURE: CPT

## 2023-11-28 PROCEDURE — 6360000004 HC RX CONTRAST MEDICATION: Performed by: RADIOLOGY

## 2023-11-28 PROCEDURE — 84443 ASSAY THYROID STIM HORMONE: CPT

## 2023-11-28 PROCEDURE — 71260 CT THORAX DX C+: CPT

## 2023-11-28 PROCEDURE — 80048 BASIC METABOLIC PNL TOTAL CA: CPT

## 2023-11-28 PROCEDURE — 87070 CULTURE OTHR SPECIMN AEROBIC: CPT

## 2023-11-28 PROCEDURE — 85025 COMPLETE CBC W/AUTO DIFF WBC: CPT

## 2023-11-28 PROCEDURE — 99222 1ST HOSP IP/OBS MODERATE 55: CPT | Performed by: SURGERY

## 2023-11-28 PROCEDURE — 84100 ASSAY OF PHOSPHORUS: CPT

## 2023-11-28 PROCEDURE — 2000000000 HC ICU R&B

## 2023-11-28 PROCEDURE — 84133 ASSAY OF URINE POTASSIUM: CPT

## 2023-11-28 PROCEDURE — 84300 ASSAY OF URINE SODIUM: CPT

## 2023-11-28 PROCEDURE — 85610 PROTHROMBIN TIME: CPT

## 2023-11-28 PROCEDURE — 71045 X-RAY EXAM CHEST 1 VIEW: CPT

## 2023-11-28 PROCEDURE — 94002 VENT MGMT INPAT INIT DAY: CPT

## 2023-11-28 PROCEDURE — 0W9930Z DRAINAGE OF RIGHT PLEURAL CAVITY WITH DRAINAGE DEVICE, PERCUTANEOUS APPROACH: ICD-10-PCS | Performed by: INTERNAL MEDICINE

## 2023-11-28 PROCEDURE — 74018 RADEX ABDOMEN 1 VIEW: CPT

## 2023-11-28 PROCEDURE — 37799 UNLISTED PX VASCULAR SURGERY: CPT

## 2023-11-28 PROCEDURE — 76937 US GUIDE VASCULAR ACCESS: CPT | Performed by: INTERNAL MEDICINE

## 2023-11-28 PROCEDURE — 32551 INSERTION OF CHEST TUBE: CPT | Performed by: SURGERY

## 2023-11-28 PROCEDURE — 82805 BLOOD GASES W/O2 SATURATION: CPT

## 2023-11-28 PROCEDURE — 82436 ASSAY OF URINE CHLORIDE: CPT

## 2023-11-28 PROCEDURE — 82962 GLUCOSE BLOOD TEST: CPT

## 2023-11-28 PROCEDURE — 84439 ASSAY OF FREE THYROXINE: CPT

## 2023-11-28 PROCEDURE — 84484 ASSAY OF TROPONIN QUANT: CPT

## 2023-11-28 PROCEDURE — 04HL33Z INSERTION OF INFUSION DEVICE INTO LEFT FEMORAL ARTERY, PERCUTANEOUS APPROACH: ICD-10-PCS | Performed by: INTERNAL MEDICINE

## 2023-11-28 PROCEDURE — 87205 SMEAR GRAM STAIN: CPT

## 2023-11-28 PROCEDURE — 83605 ASSAY OF LACTIC ACID: CPT

## 2023-11-28 PROCEDURE — 83935 ASSAY OF URINE OSMOLALITY: CPT

## 2023-11-28 PROCEDURE — 82550 ASSAY OF CK (CPK): CPT

## 2023-11-28 PROCEDURE — 93005 ELECTROCARDIOGRAM TRACING: CPT | Performed by: INTERNAL MEDICINE

## 2023-11-28 PROCEDURE — 2580000003 HC RX 258

## 2023-11-28 PROCEDURE — 2580000003 HC RX 258: Performed by: STUDENT IN AN ORGANIZED HEALTH CARE EDUCATION/TRAINING PROGRAM

## 2023-11-28 PROCEDURE — 92526 ORAL FUNCTION THERAPY: CPT | Performed by: SPEECH-LANGUAGE PATHOLOGIST

## 2023-11-28 RX ORDER — NOREPINEPHRINE BITARTRATE 0.06 MG/ML
1-100 INJECTION, SOLUTION INTRAVENOUS CONTINUOUS
Status: DISCONTINUED | OUTPATIENT
Start: 2023-11-28 | End: 2023-11-29 | Stop reason: HOSPADM

## 2023-11-28 RX ORDER — SUCCINYLCHOLINE/SOD CL,ISO/PF 200MG/10ML
SYRINGE (ML) INTRAVENOUS
Status: COMPLETED
Start: 2023-11-28 | End: 2023-11-28

## 2023-11-28 RX ORDER — LEVOTHYROXINE SODIUM ANHYDROUS 100 UG/5ML
100 INJECTION, POWDER, LYOPHILIZED, FOR SOLUTION INTRAVENOUS 2 TIMES DAILY
Status: DISCONTINUED | OUTPATIENT
Start: 2023-11-28 | End: 2023-11-29 | Stop reason: HOSPADM

## 2023-11-28 RX ORDER — DOPAMINE HYDROCHLORIDE 320 MG/100ML
1-20 INJECTION, SOLUTION INTRAVENOUS CONTINUOUS
Status: DISCONTINUED | OUTPATIENT
Start: 2023-11-28 | End: 2023-11-29 | Stop reason: HOSPADM

## 2023-11-28 RX ORDER — ETOMIDATE 2 MG/ML
INJECTION INTRAVENOUS
Status: COMPLETED
Start: 2023-11-28 | End: 2023-11-28

## 2023-11-28 RX ORDER — SUCCINYLCHOLINE/SOD CL,ISO/PF 200MG/10ML
100 SYRINGE (ML) INTRAVENOUS ONCE
Status: COMPLETED | OUTPATIENT
Start: 2023-11-28 | End: 2023-11-28

## 2023-11-28 RX ORDER — ROCURONIUM BROMIDE 10 MG/ML
INJECTION, SOLUTION INTRAVENOUS
Status: DISCONTINUED
Start: 2023-11-28 | End: 2023-11-28 | Stop reason: WASHOUT

## 2023-11-28 RX ORDER — SENNOSIDES A AND B 8.6 MG/1
1 TABLET, FILM COATED ORAL 2 TIMES DAILY
Status: DISCONTINUED | OUTPATIENT
Start: 2023-11-28 | End: 2023-11-29 | Stop reason: HOSPADM

## 2023-11-28 RX ORDER — POLYETHYLENE GLYCOL 3350 17 G/17G
17 POWDER, FOR SOLUTION ORAL DAILY
Status: DISCONTINUED | OUTPATIENT
Start: 2023-11-29 | End: 2023-11-29 | Stop reason: HOSPADM

## 2023-11-28 RX ORDER — MIDAZOLAM HYDROCHLORIDE 1 MG/ML
1-10 INJECTION, SOLUTION INTRAVENOUS CONTINUOUS
Status: DISCONTINUED | OUTPATIENT
Start: 2023-11-28 | End: 2023-11-29 | Stop reason: HOSPADM

## 2023-11-28 RX ORDER — SODIUM PHOSPHATE,MONO-DIBASIC 19G-7G/118
1 ENEMA (ML) RECTAL ONCE
Status: COMPLETED | OUTPATIENT
Start: 2023-11-28 | End: 2023-11-29

## 2023-11-28 RX ORDER — IPRATROPIUM BROMIDE AND ALBUTEROL SULFATE 2.5; .5 MG/3ML; MG/3ML
1 SOLUTION RESPIRATORY (INHALATION)
Status: DISCONTINUED | OUTPATIENT
Start: 2023-11-28 | End: 2023-11-29 | Stop reason: HOSPADM

## 2023-11-28 RX ORDER — PROPOFOL 10 MG/ML
5-50 INJECTION, EMULSION INTRAVENOUS CONTINUOUS
Status: DISCONTINUED | OUTPATIENT
Start: 2023-11-28 | End: 2023-11-29 | Stop reason: HOSPADM

## 2023-11-28 RX ORDER — SODIUM CHLORIDE 9 MG/ML
INJECTION, SOLUTION INTRAVENOUS CONTINUOUS
Status: DISCONTINUED | OUTPATIENT
Start: 2023-11-28 | End: 2023-11-29 | Stop reason: HOSPADM

## 2023-11-28 RX ORDER — PROPOFOL 10 MG/ML
INJECTION, EMULSION INTRAVENOUS
Status: DISCONTINUED
Start: 2023-11-28 | End: 2023-11-29

## 2023-11-28 RX ORDER — ETOMIDATE 2 MG/ML
20 INJECTION INTRAVENOUS ONCE
Status: COMPLETED | OUTPATIENT
Start: 2023-11-28 | End: 2023-11-28

## 2023-11-28 RX ADMIN — IPRATROPIUM BROMIDE AND ALBUTEROL SULFATE 1 DOSE: .5; 2.5 SOLUTION RESPIRATORY (INHALATION) at 14:56

## 2023-11-28 RX ADMIN — Medication 0.5 MCG/KG/HR: at 07:47

## 2023-11-28 RX ADMIN — Medication 15 MCG/MIN: at 17:08

## 2023-11-28 RX ADMIN — WATER 1000 MG: 1 INJECTION INTRAMUSCULAR; INTRAVENOUS; SUBCUTANEOUS at 07:48

## 2023-11-28 RX ADMIN — SODIUM CHLORIDE, PRESERVATIVE FREE 10 ML: 5 INJECTION INTRAVENOUS at 23:54

## 2023-11-28 RX ADMIN — IPRATROPIUM BROMIDE AND ALBUTEROL SULFATE 1 DOSE: .5; 2.5 SOLUTION RESPIRATORY (INHALATION) at 06:27

## 2023-11-28 RX ADMIN — ETOMIDATE 20 MG: 2 INJECTION, SOLUTION INTRAVENOUS at 16:52

## 2023-11-28 RX ADMIN — HYDROCORTISONE SODIUM SUCCINATE 100 MG: 100 INJECTION, POWDER, FOR SOLUTION INTRAMUSCULAR; INTRAVENOUS at 06:00

## 2023-11-28 RX ADMIN — ENOXAPARIN SODIUM 40 MG: 100 INJECTION SUBCUTANEOUS at 08:00

## 2023-11-28 RX ADMIN — VANCOMYCIN HYDROCHLORIDE 1500 MG: 10 INJECTION, POWDER, LYOPHILIZED, FOR SOLUTION INTRAVENOUS at 12:10

## 2023-11-28 RX ADMIN — PHENYLEPHRINE HYDROCHLORIDE 300 MCG/MIN: 10 INJECTION INTRAVENOUS at 17:28

## 2023-11-28 RX ADMIN — IPRATROPIUM BROMIDE AND ALBUTEROL SULFATE 1 DOSE: .5; 2.5 SOLUTION RESPIRATORY (INHALATION) at 09:09

## 2023-11-28 RX ADMIN — LEVOTHYROXINE SODIUM ANHYDROUS 100 MCG: 100 INJECTION, POWDER, LYOPHILIZED, FOR SOLUTION INTRAVENOUS at 05:05

## 2023-11-28 RX ADMIN — EPINEPHRINE 30 MCG/MIN: 1 INJECTION INTRAMUSCULAR; INTRAVENOUS; SUBCUTANEOUS at 17:43

## 2023-11-28 RX ADMIN — LEVOTHYROXINE SODIUM ANHYDROUS 100 MCG: 100 INJECTION, POWDER, LYOPHILIZED, FOR SOLUTION INTRAVENOUS at 23:53

## 2023-11-28 RX ADMIN — SODIUM CHLORIDE, PRESERVATIVE FREE 40 MG: 5 INJECTION INTRAVENOUS at 23:50

## 2023-11-28 RX ADMIN — SODIUM BICARBONATE 50 MEQ: 84 INJECTION INTRAVENOUS at 20:02

## 2023-11-28 RX ADMIN — IOPAMIDOL 75 ML: 755 INJECTION, SOLUTION INTRAVENOUS at 21:54

## 2023-11-28 RX ADMIN — IPRATROPIUM BROMIDE AND ALBUTEROL SULFATE 1 DOSE: .5; 2.5 SOLUTION RESPIRATORY (INHALATION) at 02:19

## 2023-11-28 RX ADMIN — HYDROCORTISONE SODIUM SUCCINATE 50 MG: 100 INJECTION, POWDER, FOR SOLUTION INTRAMUSCULAR; INTRAVENOUS at 16:02

## 2023-11-28 RX ADMIN — ETOMIDATE 20 MG: 2 INJECTION INTRAVENOUS at 16:52

## 2023-11-28 RX ADMIN — SODIUM CHLORIDE: 9 INJECTION, SOLUTION INTRAVENOUS at 14:23

## 2023-11-28 RX ADMIN — SODIUM CHLORIDE: 9 INJECTION, SOLUTION INTRAVENOUS at 06:53

## 2023-11-28 RX ADMIN — SODIUM BICARBONATE 100 MEQ: 84 INJECTION INTRAVENOUS at 17:10

## 2023-11-28 RX ADMIN — SENNOSIDES 8.6 MG: 8.6 TABLET, FILM COATED ORAL at 23:53

## 2023-11-28 RX ADMIN — Medication 100 MG: at 16:51

## 2023-11-28 RX ADMIN — IOPAMIDOL 75 ML: 755 INJECTION, SOLUTION INTRAVENOUS at 19:21

## 2023-11-28 ASSESSMENT — PULMONARY FUNCTION TESTS
PIF_VALUE: 64
PIF_VALUE: 44
PIF_VALUE: 41
PIF_VALUE: 45
PIF_VALUE: 42
PIF_VALUE: 57

## 2023-11-28 NOTE — CARE COORDINATION
11-28-Cm note: pt continues to require electrolyte replacement, checking BMP q3h. Pt on precedex for agitation, she is on 6l hfnc at this time, no home o2, PT/OT would be helpful in assisting with dc needs when pt can participate. Cm following. Pt is HFA eligible but not eligible for assist with meds at dc.   Electronically signed by Chin Case RN on 11/28/2023 at 4:07 PM

## 2023-11-28 NOTE — PROGRESS NOTES
4am temperature: 95.2  NP aware, bear hugger applied to pt. Will monitor temperature continuously from bladder sensor.     Alex Daniels RN  11/28/2023

## 2023-11-28 NOTE — PROGRESS NOTES
Addendum:    S/P cardiac arrest, repeat SCr now @ 1.4  Will hold current regimen & check random level tomorrow with morning labs    Perico Alexandra PharmD, [facility] 11/28/2023 9:49 PM   [phone]     Pharmacy Consultation Note  (Antibiotic Dosing and Monitoring)    Initial consult date: 11/28/23  Consulting physician/provider: Dr. Jayden Muir  Drug: Vancomycin  Indication: Bloodstream infection    Age/  Gender Height Weight IBW  Allergy Information   57 y.o./female 160 cm (5' 3\") 63.5 kg (140 lb)     Ideal body weight: 52.4 kg (115 lb 8.3 oz)  Adjusted ideal body weight: 61.7 kg (136 lb 1.2 oz)   Soap, Sulfa antibiotics, Darvocet [propoxyphene n-acetaminophen], Demerol, and Erythromycin ethylsuccinate [erythromycin ethylsuccinate]      Renal Function:  Recent Labs     11/28/23  0503 11/28/23  0749 11/28/23  1157   BUN 22* 23* 24*   CREATININE 1.0 1.0 1.0       Intake/Output Summary (Last 24 hours) at 11/28/2023 1524  Last data filed at 11/28/2023 0400  Gross per 24 hour   Intake 1696.4 ml   Output 350 ml   Net 1346.4 ml       Vancomycin Monitoring:  Trough:  No results for input(s): \"VANCOTROUGH\" in the last 72 hours. Random:  No results for input(s): \"VANCORANDOM\" in the last 72 hours. No results for input(s): \"BLOODCULT2\" in the last 72 hours. Historical Cultures:  No results found for: \"ORG\"  No results for input(s): \"BC\" in the last 72 hours. Vancomycin Administration Times:  Recent vancomycin administrations                     vancomycin (VANCOCIN) 1,500 mg in sodium chloride 0.9 % 250 mL IVPB (mg) 1,500 mg New Bag 11/28/23 1210                    Assessment:  Patient is a 59 y.o. female who has been initiated on vancomycin  Estimated Creatinine Clearance: 59 mL/min (based on SCr of 1 mg/dL).   To dose vancomycin, pharmacy will be utilizing Gojee calculation software for goal AUC/LONA 400-600 mg/L-hr     Plan:  Vancomycin 1,500 mg IV every 24 hours  Will check vancomycin levels when appropriate  Will

## 2023-11-28 NOTE — PROGRESS NOTES
SPEECH/LANGUAGE PATHOLOGY  CLINICAL ASSESSMENT OF SWALLOWING FUNCTION   and PLAN OF CARE    PATIENT NAME:  Danielle Carvalho  (female)     MRN:  [id number]    :  1965  (59 y.o.)  STATUS:  Inpatient: Room IC06/IC06-01    TODAY'S DATE:  2023  REFERRING PROVIDER:   SLP eval and treat  Start:  23 1145,   End:  23 1145,   ONE TIME,   Standing Count:  1 Occurrences,   Ann-Marie Doherty MD   REASON FOR REFERRAL: dysphagia    EVALUATING THERAPIST: DICKSON Luna                 RESULTS:    DYSPHAGIA DIAGNOSIS:   Clinical indicators of moderate oropharyngeal phase dysphagia       DIET RECOMMENDATIONS:  NPO with ongoing PO analysis by SLP only to determine if PO diet can be initiated         FEEDING RECOMMENDATIONS:     Assistance level:  Not applicable      Compensatory strategies recommended: Not applicable      Discussed recommendations with nursing and/or faxed report to referring provider: Yes    SPEECH THERAPY  PLAN OF CARE   The dysphagia POC is established based on physician order, dysphagia diagnosis and results of clinical assessment     Skilled SLP intervention for dysphagia management on acute care up to 5 x per week until goals met, pt plateaus in function and/or discharged from hospital    Conditions Requiring Skilled Therapeutic Intervention for dysphagia:    Patient is performing below functional baseline d/t  current acute condition, respiratory compromise, multiple medications, and/or increased dependency upon caregivers.   audible crackle/moist respirations that increased with PO intake   Underlying moist cough decreases ability to determine presence or absence of clinical indicators of dysphagia    Specific dysphagia interventions to include:     ongoing evaluation of swallow function to determine when PO diet can be safely initiated    Specific instructions for next treatment:  ongoing skilled PO analysis to determine if PO diet can be initiated   Patient

## 2023-11-28 NOTE — PROGRESS NOTES
[address] [place] [address]  [] Amado Shen [] Jose           [] [place]              [] ICU               [] Non ICU Floor  Pulmonary Critical Care Medicine Service      Daily Progress Note                            Note written by :  Camron Rosales M.D, [address] [place] [address] & Pulmonary Medicine                                                         Service date: 2023 6:53 PM         Name: Petra Beltran : 1965    MR/Act #: [id number],   Room: [place] [address] Age:  59 y.o. Billing #: [id number]   PCP: Adelita Burleson DO Referred by: Jaison Jackson DO ICU Attending: Solange ePlaez MD   Admit Date Hosp: 2023  8:55 PM LOS: 3 Hospital Day: 4             Code Status: Full Code {Code Details:330258721:p}        Allergies: Soap, Sulfa antibiotics, Darvocet [propoxyphene n-acetaminophen], Demerol, and Erythromycin ethylsuccinate [erythromycin ethylsuccinate]     REASON for ICU F/U and Active Problems in ICU   General     : {INDICATIONS:941449617}{[facility] ICU ICU ENCEPHALOPATHY:39351}  Pulmonary : {[facility] ICU PULM PROBLEMS:59122} {PUL ICU RESCTQNA:88679261}   Renal         : {[facility] ICU RENAL/FLUID/ELECTROLYTE PROBLEMS:77117}{[facility] ICU RENAL/FLUID/ELECTROLYTE PROBLEMS:41999}  Cardiology : {[facility] ICU CARDIOVASCULAR PROBLEMS:25708}  {INDICATIONS:891872776},  GI              : {[facility] ICU GI/NUTRITION PROBLEMS:51140}  ID              : {[facility] ICU ID PROBLEMS:[id number]}   Neuro        : {[facility] ICU NEURO PROBLEMS:06245}    SYNOPSIS  ***  .                               DAILY PROGRESS NOTES :53 PM  ICU Day: {QXXF:53141}  {NUMBER:56372::\"1\"}  Vent day: {DATE/MONTH/YEAR:::\"N/A\"} {NUMBERS; 7-6/KTF ZDNGIEOJIT:62932::\"IYH applicable\"}  Extubated:  {DATE/MONTH/YEAR:::\"N/A\"} Central Line: {EAYL:50922} {NUMBER 1-10:3668743465}  PIC Line: {ZOOZ:34452} {NUMBER 1-10:6484444741}  NG/OG/Peg: {YES/NO:::\"No\"}  Foleys: {no/yes/date:} Antibiotic:   SUBJECTIVE   {improved/no 115*   < > 110* 106* 110* 152*   CALCIUM 8.4*   < > 8.5*   < > 8.3* 8.2* 8.6 8.5*   PROT 6.1*  --  6.3*  --  6.2*  --   --   --    LABALBU 4.2  --  4.0  --  4.1  --   --   --    BILITOT 0.4  --  0.3  --  0.3  --   --   --    ALKPHOS 102  --  109*  --  115*  --   --   --    *  --  197*  --  244*  --   --   --    ALT 36*  --  46*  --  87*  --   --   --     < > = values in this interval not displayed. Cardiac  : [unfilled] @TROPHS(72)@   Lab Results   Component Value Date    CKTOTAL 6,178 (H) 11/28/2023    CKTOTAL 7,101 (H) 11/27/2023    CKTOTAL 4,179 (H) 11/26/2023     Lab Results   Component Value Date    PROBNP 2,511 (H) 11/26/2023     BNP  : No results for input(s): \"BNP\" in the last 72 hours. Lactic Acid : @BRIEFLAB(LACTA:3)    Lipase  :   Recent Labs     11/26/23  0011   LIPASE 19     Magnesium :   Lab Results   Component Value Date/Time    MG 1.8 11/26/2023 06:10 AM     Phosphorus :   Lab Results   Component Value Date/Time    PHOS 2.5 11/26/2023 06:10 AM     Ionized Calc : No results found for: \"CAION\"   . Microbiology      : {MICROBIOLOGY:894033419}  . ................................................................................................................. Lucy Tena Antibiotic Level : {ANTIBIOTIC LEVELS:845883904}  . ABG's:   Recent Labs     11/28/23 1802   PH 7.365   PCO2 34.5*   PO2 466.5*   HCO3 19.3*   BE -5.4*   O2SAT 99.8*     . VENT SETTINGS:  {YES/NO/NA:52506}  Vent Information  Vent Mode: AC/VC (Vent set-up as per physican's verbal orders.) Additional Respiratory Assessments  Pulse: 68  Respirations: 24  SpO2: 94 %  . IMAGERY RESULTS (last 7 days)   XR CHEST PORTABLE    Result Date: 11/28/2023  1. Pleural and parenchymal opacities in the lung bases with elevation of the right hemidiaphragm. 2.  Nonspecific interstitial prominence, chronic changes versus component of edema or atypical infection in the acute setting. 3. Right IJ venous catheter tip projected over the SVC.   Possible kinking

## 2023-11-28 NOTE — PROGRESS NOTES
Name: Kitty Sanders  : 1965  MRN: [id number]    Date:  23    Time: 6:41 PM EST    Benefits of immediately proceeding with Radiology exam(s) without pre-testing outweigh the risks or are not indicated as specified below and therefore the following is/are being waived:    [] Pregnancy test   [] Patients LMP on-time and regular.   [] Patient had Tubal Ligation or has other Contraception Device. [] Patient  is Menopausal or Premenarcheal.    [] Patient had Full or Partial Hysterectomy. [] Protocol for Iodine allergy    [] MRI Questionnaire     [x] BUN/Creatinine   [] Patient age w/no hx of renal dysfunction. [] Patient on Dialysis. [] Recent Normal Labs. Electronically signed by Colene Koyanagi, MD on 23 at 6:41 PM EST          Concern by ICU staff for worsening abdominal distension, poss perforated viscus. Proceed w/ CT a/p w/ IV contrast despite risks of contrast induced nephropathy given acuity of condition.       Colene Koyanagi, MD  PGY-5

## 2023-11-28 NOTE — PROCEDURES
Patient Name: Lj Baker   Medical Record Number: [id number]  Date: 11/28/2023   Time: 6:12 PM   Room/Bed: [place] [address]    Arterial Line Placement Procedure Note                     Indication: severe hypotension and cardiovascular instability    Consent: Unable to be obtained due to the emergent nature of this procedure. Kolton's Test: Not indicated in this particular procedure    Procedure: The skin over the left femoral artery was prepped with Chloraprep and draped in a sterile fashion. Local anesthesia was not performed due to the emergent nature of this procedure. Using ultrasound, a 4 [place] arterial line catheter was then inserted, using a modified Seldinger technique, into the vessel. The transducer set was then attached and securely fastened to the skin with sutures. Waveforms on the monitor were observed and found to be adequate. The patient had good distal perfusion after the procedure. The site was then dressed in a sterile fashion. The patient tolerated the procedure well.      Complications: None    Electronically signed by Priscila Villafana MD on 11/28/2023 at 6:16 PM

## 2023-11-28 NOTE — PLAN OF CARE
Problem: Discharge Planning  Goal: Discharge to home or other facility with appropriate resources  11/27/2023 2312 by Raina Aldridge RN  Outcome: Progressing  11/27/2023 1106 by Myles Godfrey RN  Outcome: Progressing     Problem: Skin/Tissue Integrity  Goal: Absence of new skin breakdown  Description: 1. Monitor for areas of redness and/or skin breakdown  2. Assess vascular access sites hourly  3. Every 4-6 hours minimum:  Change oxygen saturation probe site  4. Every 4-6 hours:  If on nasal continuous positive airway pressure, respiratory therapy assess nares and determine need for appliance change or resting period.   11/27/2023 2312 by Rob Dunn RN  Outcome: Progressing  11/27/2023 1106 by Myles Godfrey RN  Outcome: Progressing     Problem: Safety - Adult  Goal: Free from fall injury  11/27/2023 2312 by Rob Dunn RN  Outcome: Progressing  11/27/2023 1106 by Myles Godfrey RN  Outcome: Progressing     Problem: Neurosensory - Adult  Goal: Achieves stable or improved neurological status  11/27/2023 2312 by Rob Dunn RN  Outcome: Progressing  11/27/2023 1106 by Myles Godfrey RN  Outcome: Progressing  Goal: Absence of seizures  11/27/2023 2312 by Rob Dunn RN  Outcome: Progressing  11/27/2023 1106 by Myles Godfrey RN  Outcome: Progressing  Goal: Remains free of injury related to seizures activity  11/27/2023 2312 by Rob Dunn RN  Outcome: Progressing  11/27/2023 1106 by Myles Godfrey RN  Outcome: Progressing  Goal: Achieves maximal functionality and self care  11/27/2023 2312 by Rob Dunn RN  Outcome: Progressing  11/27/2023 1106 by Myles Godfrey RN  Outcome: Progressing     Problem: Respiratory - Adult  Goal: Achieves optimal ventilation and oxygenation  11/27/2023 2312 by Rob Dunn RN  Outcome: Progressing  11/27/2023 1106 by Myles Godfrey RN  Outcome: Progressing     Problem: Cardiovascular - Adult  Goal: Maintains optimal

## 2023-11-28 NOTE — PLAN OF CARE
Problem: Discharge Planning  Goal: Discharge to home or other facility with appropriate resources  11/28/2023 1044 by Esther Brady RN  Outcome: Progressing     Problem: Skin/Tissue Integrity  Goal: Absence of new skin breakdown  Description: 1. Monitor for areas of redness and/or skin breakdown  2. Assess vascular access sites hourly  3. Every 4-6 hours minimum:  Change oxygen saturation probe site  4. Every 4-6 hours:  If on nasal continuous positive airway pressure, respiratory therapy assess nares and determine need for appliance change or resting period.   11/28/2023 1044 by Esther Brady RN  Outcome: Progressing     Problem: Safety - Adult  Goal: Free from fall injury  11/28/2023 1044 by Esther Brady RN  Outcome: Progressing     Problem: Neurosensory - Adult  Goal: Achieves stable or improved neurological status  11/28/2023 1044 by Esther Brady RN  Outcome: Progressing     Problem: Neurosensory - Adult  Goal: Absence of seizures  11/28/2023 1044 by Esther Brady RN  Outcome: Progressing     Problem: Neurosensory - Adult  Goal: Remains free of injury related to seizures activity  11/28/2023 1044 by Esther Brady RN  Outcome: Progressing     Problem: Neurosensory - Adult  Goal: Achieves maximal functionality and self care  11/28/2023 1044 by Esther Brady RN  Outcome: Progressing     Problem: Respiratory - Adult  Goal: Achieves optimal ventilation and oxygenation  11/28/2023 1044 by Esther Brady RN  Outcome: Progressing     Problem: Cardiovascular - Adult  Goal: Maintains optimal cardiac output and hemodynamic stability  11/28/2023 1044 by Esther Brady RN  Outcome: Progressing     Problem: Skin/Tissue Integrity - Adult  Goal: Skin integrity remains intact  11/28/2023 1044 by Esther Brady RN  Outcome: Progressing  Flowsheets (Taken 11/28/2023 1026)  Skin Integrity Remains Intact:   Monitor for areas of redness and/or skin breakdown   Assess vascular

## 2023-11-28 NOTE — PROGRESS NOTES
ABGs Drawn, critical values, Dr. Iris Gibson notified. Pt's RN began bagging paitent. Assisted Dr. With intubation. 7.5 ETT was secured 23 cm at the upper lip using AnchorFast quinteros. Easy Cap was positive for CO2 and expiratory condensation was present. BR/S were bilateral and there were no epigastric ventilatory sounds present. Vent set-up as per Dr. Ken Yoon verbal orders.

## 2023-11-28 NOTE — CODE DOCUMENTATION
Dr. Fannie Bishop RN, Daiana Jones RN, Dr. Tali guzman, Janelle CARLTON, Pratima Reid RN at bedside.

## 2023-11-28 NOTE — PROGRESS NOTES
Internal Medicine Progress Note    [facility]    Reggie Colunga. Kenzie Pires., F.A.C.O.I. Oren Bravo D.O., JAHOJED. Hari Armstrong D.O. Joseph Baptiste, MSN, APRN, NP-C  Blessing Walker. Petty Mendez, MSN, APRN-CNP  Alan Devi, MSN, APRN-CNP     Primary Care Physician: Jama Bella DO   Admitting Physician:  Holden Norris DO  Admission date and time: 11/25/2023  8:55 PM    Room:  [place] [address]  Admitting diagnosis: Hypokalemia [E87.6]  Hypochloremia [E87.8]  Hyponatremia [E87.1]  Non-traumatic rhabdomyolysis [M62.82]  Altered mental status, unspecified altered mental status type [R41.82]  Hypothyroidism, unspecified type [E03.9]    Patient Name: Tang Huntley  MRN: [id number]    Date of Service: 11/28/2023     Subjective:  April is a 59 y.o. female who was seen and examined today,11/28/2023, at the bedside. April seems to be more awake today and is able to carry on a conversation. Sodium continues to trend upward. She was evaluated in the presence of her 2 stepdaughters and her  was updated via the telephone. I have personally discussed the case with the nephrology team.  Obviously, noncompliance remains a significant issue at this point and this was discussed at length with the stepdaughters at the bedside. Review of System:  Much more awake today. Falls asleep easily during the examination but is able to answer questions accordingly. HEENT:   Denies ear pain, sore throat, sinus or eye problems. Nasal cannula oxygen is in place. Cardiovascular:   Denies any chest pain, irregular heartbeats, or palpitations. Respiratory:   Denies shortness of breath, coughing, sputum production, hemoptysis, or wheezing. Gastrointestinal:   Increasing appetite but currently unable to tolerate oral ingestion. She has no overt nausea or vomiting. Genitourinary:    Denies any urgency, frequency, hematuria.   Voiding with the use of a

## 2023-11-28 NOTE — PROGRESS NOTES
Speech Language Pathology      NAME:  Danielle Smith  :  1965  DATE: 2023  ROOM:  [facility] [address]    Patient seen for dysphagia therapy 10 minutes. Patient less alert/more tired this PM.  Patient continues with audible wheeze at rest and an elevated respiratory rate of 30+ at times. Given this patient is not appropriate for PO trial this PM   discussed with nursing who are in agreement hopefully if sodium levels improves and her alertness and respiratory rate decreases she will be medically appropriate to have swallow study completed tomorrow. Family not present during PM session.   Will continue POC     Hypokalemia [E87.6]  Hypochloremia [E87.8]  Hyponatremia [E87.1]  Non-traumatic rhabdomyolysis [M62.82]  Altered mental status, unspecified altered mental status type [R41.82]  Hypothyroidism, unspecified type [E03.9]    [address]24  dysphagia tx    Olita Boas MSCCC/SLP  Speech Language Pathologist  [phone]

## 2023-11-28 NOTE — CODE DOCUMENTATION
Code blue note    Code blue was called at ***    On arrival, pt was ***    ACLS protocol was initiated and the following was given:  Adrenaline: x2   Atropine:  x  Calcium gluconate: 1   Sodium bicarbonate: 2     Patient intubated and placed on ventilator. Central line inserted per team members. ABG obtained revealed:***    We continued CPR for ***, and pt was revived (pt was in ICU)  We continued CPR for ***, but unfortunately the patient was not revived. CPR was done for *** min and was stopped at [facility], PCP and Intensives were updated.     Patient was pronounced dead at [facility],   Pulmonary & Critical Care Medicine  11/28/2023 5:54 PM

## 2023-11-28 NOTE — PROGRESS NOTES
Critical Care Admit/Consult Note     Patient -  Geni Valderrama   MRN -  [id number]   [facility] # - [de-identified]   - 1965      Date of Admission -  2023  8:55 PM  Date of evaluation -  2023  [facility] - 3  Assessment and Plan  Mrs. Chata Hunt is a 59 y.o. female with the following medical problems:   AMS, multifactorial, metabolic encephalopathy   Hypotonic hyponatremia, likely prerenal  Severe hypothyroidism  Hypothermia 2/2 #3  Lactic acidosis  Hypokalemia   Elevated troponin, most likely demand ischemia, denies any chest pain  HF? CT Chest with cardiomegaly with mild pericardial effusion  Fall  Elevated CK secondary to hypothyroidism  Prolonged Qtc  UTI, UA with +1 bacteria  GERD  Hx HTN, home medication includes metoprolol  Depressive disorder, home medications include Seroquel, buspirone, fluoxetine,   Hx Insomnia, takes Ambien as home medication   Cervical radiculopathy, follows with Dr. Valery Smith  ------------------------------------------------------------------------------  Continue to monitor neurovascular assessment, CT head negative for any acute process   Seizure precautions   Nephrology consulted, appreciate recommendations   S/p hypertonic solution   BMP per nephrology   Continue to monitor sodium  Strict I's and O's   Continue with heating blanket for temperature management   Start stress dose steroids, solu-cortef 100 mg Q8   Start levothyroxine 50 mcg BID, continue to follow TSH as needed  Obtain T3, T4 <0.04, TSH 87.7  Obtain Echo and proBNP  Continue to trend CK  Avoid agents that prolong Qtc  Start rocephin, await urine culture and other pan cultures   DVT  prophylaxis: Lovenox  GI prophylaxis: PPI  Diet: Speech therapy and start diet if tolerated. Precedex for agitation  CXR      Interval History: Ms. Chata Hunt is a 59year old who was admitted on  after presenting to the ED for AMS.  The patient's  reports the last known well Connections: Not on file   Intimate Partner Violence: Not on file   Housing Stability: Not on file       Current Medications:     Current Facility-Administered Medications:     ipratropium 0.5 mg-albuterol 2.5 mg (DUONEB) nebulizer solution 1 Dose, 1 Dose, Inhalation, 4x Daily RT, Sharon Martines, RCP, 1 Dose at 11/28/23 0627    0.9 % sodium chloride infusion, , IntraVENous, Continuous, Jason Carias MD, Last Rate: 100 mL/hr at 11/28/23 0653, New Bag at 11/28/23 0653    potassium chloride 20 mEq/50 mL IVPB (Central Line), 20 mEq, IntraVENous, PRN, Federico Trevino MD, Stopped at 11/27/23 2017    hydrocortisone sodium succinate PF (SOLU-CORTEF) injection 100 mg, 100 mg, IntraVENous, Q8H, Niharika Oseguera, APRN - CNP, 100 mg at 11/28/23 0600    glucose chewable tablet 16 g, 4 tablet, Oral, PRN, Yael, Ismail U, DO    dextrose bolus 10% 125 mL, 125 mL, IntraVENous, PRN **OR** dextrose bolus 10% 250 mL, 250 mL, IntraVENous, PRN, Yael, Ismail U, DO    glucagon injection 1 mg, 1 mg, SubCUTAneous, PRN, Yael, Ismail U, DO    dextrose 10 % infusion, , IntraVENous, Continuous PRN, Yael, Ismail U, DO    sodium chloride flush 0.9 % injection 5-40 mL, 5-40 mL, IntraVENous, 2 times per day, Yael, Ismail U, DO, 10 mL at 11/27/23 2001    sodium chloride flush 0.9 % injection 5-40 mL, 5-40 mL, IntraVENous, PRN, Yael, Ismail U, DO    0.9 % sodium chloride infusion, , IntraVENous, PRN, Yael, Ismail U, DO    magnesium sulfate 2000 mg in 50 mL IVPB premix, 2,000 mg, IntraVENous, PRN, Yael, Ismail U, DO    enoxaparin (LOVENOX) injection 40 mg, 40 mg, SubCUTAneous, Daily, Yael, Ismail U, DO, 40 mg at 11/28/23 0800    polyethylene glycol (GLYCOLAX) packet 17 g, 17 g, Oral, Daily PRN, Yael, Ismail U, DO    acetaminophen (TYLENOL) tablet 650 mg, 650 mg, Oral, Q6H PRN **OR** acetaminophen (TYLENOL) suppository 650 mg, 650 mg, Rectal, Q6H PRN, Yael, Ismail U, DO    ipratropium 0.5 mg-albuterol 2.5 mg (DUONEB) nebulizer

## 2023-11-29 ENCOUNTER — APPOINTMENT (OUTPATIENT)
Dept: GENERAL RADIOLOGY | Age: 58
DRG: 871 | End: 2023-11-29

## 2023-11-29 VITALS
BODY MASS INDEX: 29.57 KG/M2 | WEIGHT: 166.9 LBS | HEART RATE: 20 BPM | OXYGEN SATURATION: 92 % | SYSTOLIC BLOOD PRESSURE: 111 MMHG | DIASTOLIC BLOOD PRESSURE: 65 MMHG | TEMPERATURE: 99.1 F | HEIGHT: 63 IN | RESPIRATION RATE: 24 BRPM

## 2023-11-29 PROBLEM — J96.01 ACUTE HYPOXIC RESPIRATORY FAILURE (HCC): Status: ACTIVE | Noted: 2023-11-29

## 2023-11-29 LAB
25(OH)D3 SERPL-MCNC: 11.2 NG/ML (ref 30–100)
AADO2: 221.2 MMHG
ABO/RH: NORMAL
ACB COMPLEX DNA BLD POS QL NAA+NON-PROBE: NOT DETECTED
ALBUMIN SERPL-MCNC: 3.2 G/DL (ref 3.5–5.2)
ALP SERPL-CCNC: 141 U/L (ref 35–104)
ALT SERPL-CCNC: 94 U/L (ref 0–32)
ANION GAP SERPL CALCULATED.3IONS-SCNC: 10 MMOL/L (ref 7–16)
ANION GAP SERPL CALCULATED.3IONS-SCNC: 12 MMOL/L (ref 7–16)
ANION GAP SERPL CALCULATED.3IONS-SCNC: 14 MMOL/L (ref 7–16)
ANION GAP SERPL CALCULATED.3IONS-SCNC: 17 MMOL/L (ref 7–16)
ANTIBODY SCREEN: NEGATIVE
ARM BAND NUMBER: NORMAL
AST SERPL-CCNC: 220 U/L (ref 0–31)
B FRAGILIS DNA BLD POS QL NAA+NON-PROBE: NOT DETECTED
B.E.: -4.6 MMOL/L (ref -3–3)
BASOPHILS # BLD: 0 K/UL (ref 0–0.2)
BASOPHILS NFR BLD: 0 % (ref 0–2)
BILIRUB SERPL-MCNC: 0.4 MG/DL (ref 0–1.2)
BIOFIRE TEST COMMENT: ABNORMAL
BLOOD BANK DISPENSE STATUS: NORMAL
BLOOD BANK SAMPLE EXPIRATION: NORMAL
BPU ID: NORMAL
BUN SERPL-MCNC: 32 MG/DL (ref 6–20)
BUN SERPL-MCNC: 36 MG/DL (ref 6–20)
BUN SERPL-MCNC: 37 MG/DL (ref 6–20)
BUN SERPL-MCNC: 38 MG/DL (ref 6–20)
C ALBICANS DNA BLD POS QL NAA+NON-PROBE: NOT DETECTED
C AURIS DNA BLD POS QL NAA+NON-PROBE: NOT DETECTED
C GATTII+NEOFOR DNA BLD POS QL NAA+N-PRB: NOT DETECTED
C GLABRATA DNA BLD POS QL NAA+NON-PROBE: NOT DETECTED
C KRUSEI DNA BLD POS QL NAA+NON-PROBE: NOT DETECTED
C PARAP DNA BLD POS QL NAA+NON-PROBE: NOT DETECTED
C TROPICLS DNA BLD POS QL NAA+NON-PROBE: NOT DETECTED
CA-I BLD-SCNC: 1.05 MMOL/L (ref 1.15–1.33)
CALCIUM SERPL-MCNC: 6.7 MG/DL (ref 8.6–10.2)
CALCIUM SERPL-MCNC: 7.3 MG/DL (ref 8.6–10.2)
CALCIUM SERPL-MCNC: 7.8 MG/DL (ref 8.6–10.2)
CALCIUM SERPL-MCNC: 7.9 MG/DL (ref 8.6–10.2)
CHLORIDE SERPL-SCNC: 86 MMOL/L (ref 98–107)
CHLORIDE SERPL-SCNC: 87 MMOL/L (ref 98–107)
CHLORIDE SERPL-SCNC: 87 MMOL/L (ref 98–107)
CHLORIDE SERPL-SCNC: 93 MMOL/L (ref 98–107)
CK SERPL-CCNC: 3132 U/L (ref 20–180)
CO2 SERPL-SCNC: 16 MMOL/L (ref 22–29)
CO2 SERPL-SCNC: 22 MMOL/L (ref 22–29)
CO2 SERPL-SCNC: 24 MMOL/L (ref 22–29)
CO2 SERPL-SCNC: 24 MMOL/L (ref 22–29)
COHB: 0 % (ref 0–1.5)
COMPONENT: NORMAL
CREAT SERPL-MCNC: 1.4 MG/DL (ref 0.5–1)
CREAT SERPL-MCNC: 1.5 MG/DL (ref 0.5–1)
CREAT SERPL-MCNC: 1.7 MG/DL (ref 0.5–1)
CREAT SERPL-MCNC: 1.9 MG/DL (ref 0.5–1)
CRITICAL: ABNORMAL
CROSSMATCH RESULT: NORMAL
DATE ANALYZED: ABNORMAL
DATE LAST DOSE: ABNORMAL
DATE OF COLLECTION: ABNORMAL
E CLOAC COMP DNA BLD POS NAA+NON-PROBE: NOT DETECTED
E COLI DNA BLD POS QL NAA+NON-PROBE: NOT DETECTED
E FAECALIS DNA BLD POS QL NAA+NON-PROBE: NOT DETECTED
E FAECIUM DNA BLD POS QL NAA+NON-PROBE: NOT DETECTED
ENTEROBACTERALES DNA BLD POS NAA+N-PRB: NOT DETECTED
EOSINOPHIL # BLD: 0 K/UL (ref 0.05–0.5)
EOSINOPHILS RELATIVE PERCENT: 0 % (ref 0–6)
ERYTHROCYTE [DISTWIDTH] IN BLOOD BY AUTOMATED COUNT: 13.7 % (ref 11.5–15)
FIO2: 50 %
GFR SERPL CREATININE-BSD FRML MDRD: 30 ML/MIN/1.73M2
GFR SERPL CREATININE-BSD FRML MDRD: 34 ML/MIN/1.73M2
GFR SERPL CREATININE-BSD FRML MDRD: 39 ML/MIN/1.73M2
GFR SERPL CREATININE-BSD FRML MDRD: 43 ML/MIN/1.73M2
GLUCOSE BLD-MCNC: 132 MG/DL (ref 74–99)
GLUCOSE SERPL-MCNC: 153 MG/DL (ref 74–99)
GLUCOSE SERPL-MCNC: 71 MG/DL (ref 74–99)
GLUCOSE SERPL-MCNC: 76 MG/DL (ref 74–99)
GLUCOSE SERPL-MCNC: 89 MG/DL (ref 74–99)
GP B STREP DNA BLD POS QL NAA+NON-PROBE: NOT DETECTED
HAEM INFLU DNA BLD POS QL NAA+NON-PROBE: NOT DETECTED
HCO3: 20.8 MMOL/L (ref 22–26)
HCT VFR BLD AUTO: 12.7 % (ref 34–48)
HCT VFR BLD AUTO: 22.8 % (ref 34–48)
HGB BLD-MCNC: 4.1 G/DL (ref 11.5–15.5)
HGB BLD-MCNC: 8 G/DL (ref 11.5–15.5)
HHB: 4.4 % (ref 0–5)
INR PPP: 1.5
K OXYTOCA DNA BLD POS QL NAA+NON-PROBE: NOT DETECTED
KLEBSIELLA SP DNA BLD POS QL NAA+NON-PRB: NOT DETECTED
KLEBSIELLA SP DNA BLD POS QL NAA+NON-PRB: NOT DETECTED
L MONOCYTOG DNA BLD POS QL NAA+NON-PROBE: NOT DETECTED
LAB: ABNORMAL
LACTATE BLDV-SCNC: 1.8 MMOL/L (ref 0.5–2.2)
LACTATE BLDV-SCNC: 8.4 MMOL/L (ref 0.5–2.2)
LYMPHOCYTES NFR BLD: 0.43 K/UL (ref 1.5–4)
LYMPHOCYTES RELATIVE PERCENT: 8 % (ref 20–42)
Lab: 500
MCH RBC QN AUTO: 31 PG (ref 26–35)
MCHC RBC AUTO-ENTMCNC: 35.1 G/DL (ref 32–34.5)
MCV RBC AUTO: 88.4 FL (ref 80–99.9)
METAMYELOCYTES ABSOLUTE COUNT: 0.09 K/UL (ref 0–0.12)
METAMYELOCYTES: 2 % (ref 0–1)
METHB: 0.9 % (ref 0–1.5)
MICROORGANISM SPEC CULT: ABNORMAL
MICROORGANISM/AGENT SPEC: ABNORMAL
MODE: AC
MONOCYTES NFR BLD: 0.24 K/UL (ref 0.1–0.95)
MONOCYTES NFR BLD: 4 % (ref 2–12)
MYELOCYTES ABSOLUTE COUNT: 0.09 K/UL
MYELOCYTES: 2 %
N MEN DNA BLD POS QL NAA+NON-PROBE: NOT DETECTED
NEUTROPHILS NFR BLD: 84 % (ref 43–80)
NEUTS SEG NFR BLD: 4.55 K/UL (ref 1.8–7.3)
NUCLEATED RED BLOOD CELLS: 1 PER 100 WBC
O2 CONTENT: 12.1 ML/DL
O2 SATURATION: 95.6 % (ref 92–98.5)
O2HB: 94.7 % (ref 94–97)
OPERATOR ID: ABNORMAL
P AERUGINOSA DNA BLD POS NAA+NON-PROBE: NOT DETECTED
PARTIAL THROMBOPLASTIN TIME: 67.6 SEC (ref 24.5–35.1)
PATIENT TEMP: 37 C
PCO2: 39.8 MMHG (ref 35–45)
PEEP/CPAP: 5 CMH2O
PFO2: 1.56 MMHG/%
PH BLOOD GAS: 7.34 (ref 7.35–7.45)
PLATELET # BLD AUTO: 178 K/UL (ref 130–450)
PMV BLD AUTO: 10.8 FL (ref 7–12)
PO2: 78 MMHG (ref 75–100)
POTASSIUM SERPL-SCNC: 4.3 MMOL/L (ref 3.5–5)
POTASSIUM SERPL-SCNC: 4.3 MMOL/L (ref 3.5–5)
POTASSIUM SERPL-SCNC: 4.4 MMOL/L (ref 3.5–5)
POTASSIUM SERPL-SCNC: 4.5 MMOL/L (ref 3.5–5)
PROT SERPL-MCNC: 4.9 G/DL (ref 6.4–8.3)
PROTEUS SP DNA BLD POS QL NAA+NON-PROBE: NOT DETECTED
PROTHROMBIN TIME: 17 SEC (ref 9.3–12.4)
PTH-INTACT SERPL-MCNC: 166.7 PG/ML (ref 15–65)
RBC # BLD AUTO: 2.58 M/UL (ref 3.5–5.5)
RBC # BLD: NORMAL 10*6/UL
RI(T): 2.84
RR MECHANICAL: 24 B/MIN
S AUREUS DNA BLD POS QL NAA+NON-PROBE: NOT DETECTED
S AUREUS+CONS DNA BLD POS NAA+NON-PROBE: NOT DETECTED
S EPIDERMIDIS DNA BLD POS QL NAA+NON-PRB: NOT DETECTED
S LUGDUNENSIS DNA BLD POS QL NAA+NON-PRB: NOT DETECTED
S MALTOPHILIA DNA BLD POS QL NAA+NON-PRB: NOT DETECTED
S MARCESCENS DNA BLD POS NAA+NON-PROBE: NOT DETECTED
S PNEUM DNA BLD POS QL NAA+NON-PROBE: NOT DETECTED
S PYO DNA BLD POS QL NAA+NON-PROBE: NOT DETECTED
SALMONELLA DNA BLD POS QL NAA+NON-PROBE: NOT DETECTED
SERVICE CMNT-IMP: ABNORMAL
SODIUM SERPL-SCNC: 120 MMOL/L (ref 132–146)
SODIUM SERPL-SCNC: 123 MMOL/L (ref 132–146)
SODIUM SERPL-SCNC: 123 MMOL/L (ref 132–146)
SODIUM SERPL-SCNC: 126 MMOL/L (ref 132–146)
SOURCE, BLOOD GAS: ABNORMAL
SPECIMEN DESCRIPTION: ABNORMAL
STREPTOCOCCUS DNA BLD POS NAA+NON-PROBE: NOT DETECTED
THB: 9 G/DL (ref 11.5–16.5)
TIME ANALYZED: 503
TME LAST DOSE: ABNORMAL H
TRANSFUSION STATUS: NORMAL
TROPONIN I SERPL HS-MCNC: 1011 NG/L (ref 0–9)
TROPONIN I SERPL HS-MCNC: 1022 NG/L (ref 0–9)
TSH SERPL DL<=0.05 MIU/L-ACNC: 95 UIU/ML (ref 0.27–4.2)
UNIT DIVISION: 0
VANCOMYCIN DOSE: ABNORMAL MG
VANCOMYCIN SERPL-MCNC: 4.9 UG/ML (ref 5–40)
VT MECHANICAL: 400 ML
WBC OTHER # BLD: 5.4 K/UL (ref 4.5–11.5)

## 2023-11-29 PROCEDURE — 86900 BLOOD TYPING SEROLOGIC ABO: CPT

## 2023-11-29 PROCEDURE — 85014 HEMATOCRIT: CPT

## 2023-11-29 PROCEDURE — 6360000002 HC RX W HCPCS: Performed by: INTERNAL MEDICINE

## 2023-11-29 PROCEDURE — 85610 PROTHROMBIN TIME: CPT

## 2023-11-29 PROCEDURE — 2580000003 HC RX 258: Performed by: INTERNAL MEDICINE

## 2023-11-29 PROCEDURE — 6360000002 HC RX W HCPCS: Performed by: STUDENT IN AN ORGANIZED HEALTH CARE EDUCATION/TRAINING PROGRAM

## 2023-11-29 PROCEDURE — 2500000003 HC RX 250 WO HCPCS: Performed by: INTERNAL MEDICINE

## 2023-11-29 PROCEDURE — C9113 INJ PANTOPRAZOLE SODIUM, VIA: HCPCS | Performed by: STUDENT IN AN ORGANIZED HEALTH CARE EDUCATION/TRAINING PROGRAM

## 2023-11-29 PROCEDURE — 6370000000 HC RX 637 (ALT 250 FOR IP)

## 2023-11-29 PROCEDURE — 82805 BLOOD GASES W/O2 SATURATION: CPT

## 2023-11-29 PROCEDURE — 84443 ASSAY THYROID STIM HORMONE: CPT

## 2023-11-29 PROCEDURE — 85730 THROMBOPLASTIN TIME PARTIAL: CPT

## 2023-11-29 PROCEDURE — 2500000003 HC RX 250 WO HCPCS

## 2023-11-29 PROCEDURE — 85025 COMPLETE CBC W/AUTO DIFF WBC: CPT

## 2023-11-29 PROCEDURE — 36415 COLL VENOUS BLD VENIPUNCTURE: CPT

## 2023-11-29 PROCEDURE — 94640 AIRWAY INHALATION TREATMENT: CPT

## 2023-11-29 PROCEDURE — 6360000002 HC RX W HCPCS

## 2023-11-29 PROCEDURE — 80048 BASIC METABOLIC PNL TOTAL CA: CPT

## 2023-11-29 PROCEDURE — 82330 ASSAY OF CALCIUM: CPT

## 2023-11-29 PROCEDURE — 80053 COMPREHEN METABOLIC PANEL: CPT

## 2023-11-29 PROCEDURE — 37799 UNLISTED PX VASCULAR SURGERY: CPT

## 2023-11-29 PROCEDURE — 82306 VITAMIN D 25 HYDROXY: CPT

## 2023-11-29 PROCEDURE — 82550 ASSAY OF CK (CPK): CPT

## 2023-11-29 PROCEDURE — 84484 ASSAY OF TROPONIN QUANT: CPT

## 2023-11-29 PROCEDURE — 83605 ASSAY OF LACTIC ACID: CPT

## 2023-11-29 PROCEDURE — 86850 RBC ANTIBODY SCREEN: CPT

## 2023-11-29 PROCEDURE — 85018 HEMOGLOBIN: CPT

## 2023-11-29 PROCEDURE — 82962 GLUCOSE BLOOD TEST: CPT

## 2023-11-29 PROCEDURE — 80202 ASSAY OF VANCOMYCIN: CPT

## 2023-11-29 PROCEDURE — 6370000000 HC RX 637 (ALT 250 FOR IP): Performed by: INTERNAL MEDICINE

## 2023-11-29 PROCEDURE — 86923 COMPATIBILITY TEST ELECTRIC: CPT

## 2023-11-29 PROCEDURE — 2580000003 HC RX 258

## 2023-11-29 PROCEDURE — 2580000003 HC RX 258: Performed by: SURGERY

## 2023-11-29 PROCEDURE — A4216 STERILE WATER/SALINE, 10 ML: HCPCS | Performed by: STUDENT IN AN ORGANIZED HEALTH CARE EDUCATION/TRAINING PROGRAM

## 2023-11-29 PROCEDURE — 83970 ASSAY OF PARATHORMONE: CPT

## 2023-11-29 PROCEDURE — 2580000003 HC RX 258: Performed by: STUDENT IN AN ORGANIZED HEALTH CARE EDUCATION/TRAINING PROGRAM

## 2023-11-29 PROCEDURE — 86901 BLOOD TYPING SEROLOGIC RH(D): CPT

## 2023-11-29 PROCEDURE — 71045 X-RAY EXAM CHEST 1 VIEW: CPT

## 2023-11-29 PROCEDURE — P9047 ALBUMIN (HUMAN), 25%, 50ML: HCPCS | Performed by: INTERNAL MEDICINE

## 2023-11-29 RX ORDER — EPINEPHRINE IN SOD CHLOR,ISO 1 MG/10 ML
SYRINGE (ML) INTRAVENOUS
Status: COMPLETED
Start: 2023-11-29 | End: 2023-11-29

## 2023-11-29 RX ORDER — INSULIN LISPRO 100 [IU]/ML
0-4 INJECTION, SOLUTION INTRAVENOUS; SUBCUTANEOUS EVERY 4 HOURS
Status: DISCONTINUED | OUTPATIENT
Start: 2023-11-29 | End: 2023-11-29 | Stop reason: HOSPADM

## 2023-11-29 RX ORDER — SODIUM CHLORIDE 9 MG/ML
INJECTION, SOLUTION INTRAVENOUS PRN
Status: DISCONTINUED | OUTPATIENT
Start: 2023-11-29 | End: 2023-11-29 | Stop reason: HOSPADM

## 2023-11-29 RX ORDER — ALBUMIN (HUMAN) 12.5 G/50ML
50 SOLUTION INTRAVENOUS ONCE
Status: COMPLETED | OUTPATIENT
Start: 2023-11-29 | End: 2023-11-29

## 2023-11-29 RX ORDER — 0.9 % SODIUM CHLORIDE 0.9 %
1000 INTRAVENOUS SOLUTION INTRAVENOUS
Status: COMPLETED | OUTPATIENT
Start: 2023-11-29 | End: 2023-11-29

## 2023-11-29 RX ORDER — 0.9 % SODIUM CHLORIDE 0.9 %
1000 INTRAVENOUS SOLUTION INTRAVENOUS ONCE
Status: COMPLETED | OUTPATIENT
Start: 2023-11-29 | End: 2023-11-29

## 2023-11-29 RX ADMIN — SODIUM CHLORIDE 1000 ML: 9 INJECTION, SOLUTION INTRAVENOUS at 11:23

## 2023-11-29 RX ADMIN — PIPERACILLIN AND TAZOBACTAM 3375 MG: 3; .375 INJECTION, POWDER, LYOPHILIZED, FOR SOLUTION INTRAVENOUS at 10:19

## 2023-11-29 RX ADMIN — EPINEPHRINE 20 MCG/MIN: 1 INJECTION INTRAMUSCULAR; INTRAVENOUS; SUBCUTANEOUS at 11:20

## 2023-11-29 RX ADMIN — IPRATROPIUM BROMIDE AND ALBUTEROL SULFATE 1 DOSE: .5; 2.5 SOLUTION RESPIRATORY (INHALATION) at 08:56

## 2023-11-29 RX ADMIN — IPRATROPIUM BROMIDE AND ALBUTEROL SULFATE 1 DOSE: .5; 2.5 SOLUTION RESPIRATORY (INHALATION) at 05:08

## 2023-11-29 RX ADMIN — POLYETHYLENE GLYCOL 3350 17 G: 17 POWDER, FOR SOLUTION ORAL at 08:24

## 2023-11-29 RX ADMIN — Medication 50 MEQ: at 10:45

## 2023-11-29 RX ADMIN — VASOPRESSIN 0.03 UNITS/MIN: 20 INJECTION INTRAVENOUS at 09:44

## 2023-11-29 RX ADMIN — Medication 100 MCG/MIN: at 11:00

## 2023-11-29 RX ADMIN — SODIUM CHLORIDE, PRESERVATIVE FREE 10 ML: 5 INJECTION INTRAVENOUS at 08:25

## 2023-11-29 RX ADMIN — SODIUM CHLORIDE 1 G: 1 TABLET ORAL at 00:29

## 2023-11-29 RX ADMIN — PIPERACILLIN AND TAZOBACTAM 3375 MG: 3; .375 INJECTION, POWDER, LYOPHILIZED, FOR SOLUTION INTRAVENOUS at 03:35

## 2023-11-29 RX ADMIN — EPINEPHRINE: 0.1 INJECTION, SOLUTION ENDOTRACHEAL; INTRACARDIAC; INTRAVENOUS at 11:28

## 2023-11-29 RX ADMIN — HYDROCORTISONE SODIUM SUCCINATE 50 MG: 100 INJECTION, POWDER, FOR SOLUTION INTRAMUSCULAR; INTRAVENOUS at 08:18

## 2023-11-29 RX ADMIN — HYDROCORTISONE SODIUM SUCCINATE 50 MG: 100 INJECTION, POWDER, FOR SOLUTION INTRAMUSCULAR; INTRAVENOUS at 00:29

## 2023-11-29 RX ADMIN — DOCUSATE SODIUM 100 MG: 50 LIQUID ORAL at 08:24

## 2023-11-29 RX ADMIN — LEVOTHYROXINE SODIUM ANHYDROUS 100 MCG: 100 INJECTION, POWDER, LYOPHILIZED, FOR SOLUTION INTRAVENOUS at 08:25

## 2023-11-29 RX ADMIN — MIDAZOLAM HYDROCHLORIDE 2 MG/HR: 5 INJECTION, SOLUTION INTRAMUSCULAR; INTRAVENOUS at 02:19

## 2023-11-29 RX ADMIN — PHENYLEPHRINE HYDROCHLORIDE 30 MCG/MIN: 10 INJECTION INTRAVENOUS at 10:16

## 2023-11-29 RX ADMIN — SODIUM CHLORIDE 1000 ML: 9 INJECTION, SOLUTION INTRAVENOUS at 10:24

## 2023-11-29 RX ADMIN — ALBUMIN (HUMAN) 50 G: 25 SOLUTION INTRAVENOUS at 11:20

## 2023-11-29 RX ADMIN — Medication 16 MCG/MIN: at 02:13

## 2023-11-29 RX ADMIN — SODIUM CHLORIDE, PRESERVATIVE FREE 40 MG: 5 INJECTION INTRAVENOUS at 08:30

## 2023-11-29 RX ADMIN — SENNOSIDES 8.6 MG: 8.6 TABLET, FILM COATED ORAL at 08:25

## 2023-11-29 RX ADMIN — SODIUM CHLORIDE: 9 INJECTION, SOLUTION INTRAVENOUS at 03:36

## 2023-11-29 RX ADMIN — SALINE LAXATIVE 1 ENEMA: 7; 19 ENEMA RECTAL at 02:25

## 2023-11-29 RX ADMIN — SODIUM CHLORIDE 1000 ML: 9 INJECTION, SOLUTION INTRAVENOUS at 08:22

## 2023-11-29 RX ADMIN — SODIUM BICARBONATE 50 MEQ: 84 INJECTION INTRAVENOUS at 10:45

## 2023-11-29 RX ADMIN — SODIUM CHLORIDE 1 G: 1 TABLET ORAL at 08:24

## 2023-11-29 ASSESSMENT — PULMONARY FUNCTION TESTS
PIF_VALUE: 51
PIF_VALUE: 39
PIF_VALUE: 32
PIF_VALUE: 36
PIF_VALUE: 40
PIF_VALUE: 34
PIF_VALUE: 51
PIF_VALUE: 40
PIF_VALUE: 40
PIF_VALUE: 29
PIF_VALUE: 37
PIF_VALUE: 41
PIF_VALUE: 36
PIF_VALUE: 38
PIF_VALUE: 32
PIF_VALUE: 37
PIF_VALUE: 34

## 2023-11-29 NOTE — PROGRESS NOTES
[address] [facility] office called and updated. Pt. Is not a 's case. Okay to release the body to the  home.

## 2023-11-29 NOTE — PROGRESS NOTES
CT chest with malpositioned TLC. I removed it and applied a dressing. Cont chest tube to suction. Needs CT surgery consult and transfer to tertiary center due to right effusion likely from infusion therapy via malpositioned catheter in pleural space. May need VATS due to risk empyema/caustic effect. Saline bolus 2L due to hypotension. Monitor abd exam. Mild distended, no peritonitis. Would hold off on enemas at this time due to colitis. Gentle OG bowel regimen recommended. Discussed with Dr Mason Christian.       Canelo Mcclain MD, MS  Minimally Invasive and Bariatric Surgery  [phone] (p)  11/29/2023  6:44 AM

## 2023-11-29 NOTE — PROGRESS NOTES
Transported patient from ICU to CT scan and back again using paraPAC portable ventilator. No problems encountered. Total time 35 minutes.

## 2023-11-29 NOTE — CONSULTS
INPATIENT CARDIOLOGY CONSULT     Reason for Consult:  Post arrest / Elevated troponin     Cardiologist: Dr. Brenda Grant    Requesting Physician:  Dr. Maris Foote    Date of Consultation: 11/29/2023    Cardiology was notified of new consult for post-arrest / elevated troponin at 10:52 AM. Patient passed away at 11:31 AM prior to being seen.      Electronically signed by DEIRDRE Joshua CNP on 11/29/23 at 11:38 AM EST

## 2023-11-29 NOTE — PROGRESS NOTES
Nutrition Note    Pt seen per policy for ICU LOS, s/p arrest, per cardiology note pt has passed 11:31 this am. Will sign off.     Electronically signed by Phuong Rojas RD on 11/29/23 at 11:54 AM EST  Contact:

## 2023-11-29 NOTE — DEATH NOTES
Death Pronouncement Note  Patient's Name: April MING Garcia   Patient's YOB: 1965  MRN Number: [id number]    Admitting Provider: Justin Cortez DO  Attending Provider: Trevor Yadav DO    Patient was examined and the following were absent: Pulses, Blood Pressure, and Respiratory effort    I declared the patient dead on  at 11:31 AM    Preliminary Cause of Death:   Cardiopulmonary arrest    Shock  Hgic    Hyponatremia    Hypothyroidism    Sepsis    UTI    Electronically signed by Brandy Julian MD on 11/29/23 at 2:00 PM EST

## 2023-11-29 NOTE — PROGRESS NOTES
Dr. Ender Tracy called to bedside per family request. Family requesting update and change of code staus to DNR CCA.

## 2023-11-29 NOTE — PROGRESS NOTES
Internal Medicine Progress Note    [facility]ER=[facility]    Tricia Solano. Kathryn Cho., F.A.C.O.I. Olga Lopez D.O., LOUIS.JOSH.CPiperOPiperIPiper Talley D.O. Mary Malagon, MSN, APRN, NP-C  Ned Melo. Nicole Horn, MSN, APRN-CNP  Mayelin Matta, MSN, APRN-CNP     Primary Care Physician: Aliyah Driver DO   Admitting Physician:  Stacy Mayo DO  Admission date and time: 11/25/2023  8:55 PM    Room:  [place] [address]  Admitting diagnosis: Hypokalemia [E87.6]  Hypochloremia [E87.8]  Hyponatremia [E87.1]  Non-traumatic rhabdomyolysis [M62.82]  Altered mental status, unspecified altered mental status type [R41.82]  Hypothyroidism, unspecified type [E03.9]  Acute hypoxic respiratory failure [facility]) [J96.01]    Patient Name: Antolin Gold  MRN: [id number]    Date of Service: 11/29/2023     Subjective:  April is a 59 y.o. female who was seen and examined today,11/29/2023, at the bedside. Patient currently intubated on the ventilator. Discussed the condition with Dr. Kailey Mcmanus about possible transfer to tertiary care center. Patient remained hypotensive on Levophed and currently saline infusion. Sedated on Versed. Patient on ventilator 50% FiO2 5 of PEEP with a rate of 24 with tidal volume of 400. Right-sided chest tube draining serosanguineous. Patient apparently coded yesterday at [id number] and was intubated--chest tube inserted at the time    No family member present    Review of System: Unobtainable at the present time  Much more awake today. Falls asleep easily during the examination but is able to answer questions accordingly. HEENT:   Denies ear pain, sore throat, sinus or eye problems. Nasal cannula oxygen is in place. Cardiovascular:   Denies any chest pain, irregular heartbeats, or palpitations. Respiratory:   Denies shortness of breath, coughing, sputum production, hemoptysis, or wheezing.   Gastrointestinal:   Increasing appetite but currently unable to

## 2023-11-29 NOTE — PROGRESS NOTES
Upon entering room roughly 1630, pt very lethargic with signs of resp distress, Dr. Jose A Brooke immediately notified, order to retrieve blood gas, pt then intubated by Dr. Jose A Brooke at bedside, Family notified, pt then continued to decline on ventilator with low blood pressures and heart rate. Pt then coded and treated with ACLS. See code documentation for details. Pt stable upon shift change with family at bedside.

## 2023-11-29 NOTE — PROGRESS NOTES
Critical Care Admit/Consult Note     Patient -  eKith Iverson   MRN -  [id number]   [facility] # - [de-identified]   - 1965      Date of Admission -  2023  8:55 PM  Date of evaluation -  2023  [facility] Day - 4  Assessment and Plan  Mrs. Dario Palmer is a 59 y.o. female with the following medical problems:   AMS, multifactorial, metabolic encephalopathy   Hypotonic hyponatremia, likely prerenal  Severe hypothyroidism  Hypothermia 2/2 #3  Lactic acidosis  Hypokalemia   Elevated troponin, most likely demand ischemia, denies any chest pain  HF? CT Chest with cardiomegaly with mild pericardial effusion  Fall  Elevated CK secondary to hypothyroidism  Prolonged Qtc  UTI, UA with +1 bacteria  GERD  Hx HTN, home medication includes metoprolol  Depressive disorder, home medications include Seroquel, buspirone, fluoxetine,   Hx Insomnia, takes Ambien as home medication   Cervical radiculopathy, follows with Dr. Ralph Duran  ------------------------------------------------------------------------------  Continue to monitor neurovascular assessment, CT head negative for any acute process   Seizure precautions   Nephrology consulted, appreciate recommendations   S/p hypertonic solution   BMP per nephrology   Continue to monitor sodium  Strict I's and O's   Continue with heating blanket for temperature management   Start stress dose steroids, solu-cortef 100 mg Q8   Start levothyroxine 50 mcg BID, continue to follow TSH as needed  Obtain T3, T4 <0.04, TSH 87.7  Obtain Echo and proBNP  Continue to trend CK  Avoid agents that prolong Qtc  Start rocephin, await urine culture and other pan cultures   DVT  prophylaxis: Lovenox  GI prophylaxis: PPI  Diet: Speech therapy and start diet if tolerated. Precedex for agitation  CXR      Interval History: Ms. Dario Palmer is a 59year old who was admitted on  after presenting to the ED for AMS.  The patient's  reports the last known well

## 2023-11-29 NOTE — PROGRESS NOTES
Transported patient from ICU to CT scan and back again using paraPAC portable ventilator. No problems encountered. Total time 55 minutes.

## 2023-11-29 NOTE — PROGRESS NOTES
Gtt titrations at bedside during code titrated per physicians verbal orders at bedside. Was called to pt rm because pt was complaining of SOB. Rn increased to 3 N/clpm. O2 sat=92% Will continue to monitor.

## 2023-11-29 NOTE — PLAN OF CARE
Problem: Skin/Tissue Integrity  Goal: Absence of new skin breakdown  Description: 1. Monitor for areas of redness and/or skin breakdown  2. Assess vascular access sites hourly  3. Every 4-6 hours minimum:  Change oxygen saturation probe site  4. Every 4-6 hours:  If on nasal continuous positive airway pressure, respiratory therapy assess nares and determine need for appliance change or resting period. Outcome: Progressing     Problem: Safety - Adult  Goal: Free from fall injury  Outcome: Progressing     Problem: Skin/Tissue Integrity - Adult  Goal: Skin integrity remains intact  Outcome: Progressing     Problem: Safety - Medical Restraint  Goal: Remains free of injury from restraints (Restraint for Interference with Medical Device)  Description: INTERVENTIONS:  1. Determine that other, less restrictive measures have been tried or would not be effective before applying the restraint  2. Evaluate the patient's condition at the time of restraint application  3. Inform patient/family regarding the reason for restraint  4.  Q2H: Monitor safety, psychosocial status, comfort, nutrition and hydration  Outcome: Progressing  Flowsheets  Taken 11/29/2023 0600  Remains free of injury from restraints (restraint for interference with medical device):   Every 2 hours: Monitor safety, psychosocial status, comfort, nutrition and hydration   Evaluate the patient's condition at the time of restraint application  Taken 48/26/3257 0400  Remains free of injury from restraints (restraint for interference with medical device):   Every 2 hours: Monitor safety, psychosocial status, comfort, nutrition and hydration   Evaluate the patient's condition at the time of restraint application  Taken 16/64/9056 0200  Remains free of injury from restraints (restraint for interference with medical device):   Every 2 hours: Monitor safety, psychosocial status, comfort, nutrition and hydration   Evaluate the patient's condition at the time of

## 2023-11-29 NOTE — PROGRESS NOTES
Surgery called urgently regarding Code Blue and KUB findings concerning for free air. Patient on multiple pressors with no response and worsening status with intubation. On review of KUB obvious right tension pneumothorax present with mediastinal shift. Right chest tube placed emergently with resolution of tension and significant improvement of hypotension, decreasing pressor requirements from 5 to only levo. Chest xray after chest tube shows resolution of ptx. Upon review IJ line is not appropriately positioned although read as stable position in SVC, review of previous cxr from today and yesterday show similar findings suspicious for malpositioning since placement 11/27 12pm procedure but radiology read not reflecting. Instructed nursing to utilize peripheral access only and discontinue use of IJ catheter. Abdominal CT done and effusion present on right side chest. No signs of intraabdominal free air. Some thickening of transverse colon poss colitis from recent code and hypotension but not likely source of hypotension currently. Cont to monitor abdominal exam.     STAT chest CT ordered to ascertain right IJ catheter position. Nursing has notified intensivist of concerns.      Cassandra Jefferson MD, MS  Minimally Invasive and Bariatric Surgery  [phone] (p)  11/28/2023  9:43 PM

## 2023-11-29 NOTE — PROGRESS NOTES
Dr. Jose A Brooke called to the bedside. Pt.'s blood pressure dropping despite titrating up on them per [facility]. Dr. Jose A Brooke gave verbal orders at bedside. See orders.

## 2023-11-30 LAB
EKG ATRIAL RATE: 78 BPM
EKG P AXIS: 27 DEGREES
EKG P-R INTERVAL: 152 MS
EKG Q-T INTERVAL: 426 MS
EKG QRS DURATION: 82 MS
EKG QTC CALCULATION (BAZETT): 485 MS
EKG R AXIS: 80 DEGREES
EKG T AXIS: 79 DEGREES
EKG VENTRICULAR RATE: 78 BPM

## 2023-11-30 NOTE — DISCHARGE SUMMARY
[address] [facility]                    [address] [place], [address] [facility]                               DISCHARGE SUMMARY    PATIENT NAME: Lj Baker                     :        1965  MED REC NO:   [id number]                            ROOM:       06  ACCOUNT NO:   [de-identified]                           ADMIT DATE: 2023  PROVIDER:     Domonique Solomon DO                  DISCHARGE DATE:  2023    ADMITTING DIAGNOSES:  Multifactorial encephalopathy secondary to  electrolyte imbalance, hypoosmolar hypovolemic hyponatremic condition. SECONDARY DISCHARGE DIAGNOSES:  Profound hypothyroidism and  myxedematous, sepsis secondary to urinary tract infection with blood  culture positive for Strep viridans, rhabdomyolysis with contusion to  the upper chest, carotid stenosis, gastroesophageal reflux disease,  sleep apnea, essential hypertension, history of tobacco use, bipolar  disorder, elevated transaminases. COMPLICATIONS:  Cardiac arrest with the patient intubated on ,  complicated by right pleural effusion due to a malpositioned cath on the  pleural space probably not secondary to encephalopathy, hemorrhagic  anemia. CAUSE OF DEATH:  Cardiopulmonary arrest with hemorrhagic shock as well  as electrolyte infection, complicated by hypothyroidism and sepsis and  urinary tract infection. The patient  at 11:31 a.m. on  2023. CONSULTATIONS OBTAINED:  With Cardiology, Dr. Kishan Baeza; Dr. Manny Titus,  Critical Care. No OMT was given. ADMITTING PROVIDERS:  Dr. Elvi Davidson and Dr. Floyd Simpson. CHIEF COMPLAINT AND HISTORY OF CHIEF COMPLAINT:  The patient is a  47-year-old white female, who was admitted to [facility]. The  patient presented to the hospital here on 2023. The presented to  the hospital here for what appeared to be altered mental status.   The  patient was presently at the bedside; apparently the , daughter,  and son severe hyponatremia noted at this time, consultation was  obtained with Nephrology, who was participating in managing her care. Electrolytes were monitored closely along with further adjustment being  made accordingly. It was noted at that time that the patient did show  evidence of profound hypothyroidism. Adjustments were made accordingly  in her thyroid medicine at this time, and the patient began to improve. On 11/26/2023, the patient was somewhat more alert, although she did  have repetitive statement done. She did follow simple command, but  otherwise, I do not have any further insight into the history. On  11/27/2023, the electrolytes were gradually improving. Family members  at that time did bring in multiple medications, which seemed to be _____  of her problem. The patient was seen on 11/27/2023 by Dr. Jaclyn Martinez. The  patient did seem to respond well and alert at this time. She did fall  sleep easily but had to be maintained on a Precedex infusion. The  patient's electrolytes were monitored accordingly. The patient  continued to improve. On the 11/28/2023, the patient at that time in  the early morning did seem to be more awake. Her sodium was improving  at this time. It was titrated upwards. The patient was interviewed by  Dr. Jaclyn Martinez in the presence of the two stepdaughters and . The  patient at that time did seem to like she was improving, obviously  discussion that he had with the family member at that time was obviously  compliant was an issue. The patient at this time seemed to be  relatively stable at this time. Unfortunately, the patient did have an  episode of what appeared to an event that occurred approximately late in  the evening on 11/28/2023. The patient at this time did seem to suffer  a cardiac event. At approximately 5:32, the patient did have a  cardiopulmonary event, advanced cardiac life support was carried out,  and the patient was intubated.   Radiographic

## 2023-12-01 LAB
MICROORGANISM SPEC CULT: NORMAL
MICROORGANISM SPEC CULT: NORMAL
MICROORGANISM/AGENT SPEC: NORMAL
SERVICE CMNT-IMP: NORMAL
SPECIMEN DESCRIPTION: NORMAL
SPECIMEN DESCRIPTION: NORMAL

## 2023-12-03 LAB — VASOPRESSIN SERPL-MCNC: 40.8 PG/ML (ref 0–6.9)

## 2025-04-28 NOTE — H&P
Onset    Heart Attack Mother     Stroke Mother     Heart Disease Mother     Cancer Father        HOME MEDICATIONS:  Prior to Admission medications    Medication Sig Start Date End Date Taking? Authorizing Provider   HYDROcodone-acetaminophen (NORCO) 5-325 MG per tablet Take 1 tablet by mouth every 8 hours as needed for Pain for up to 30 days. Intended supply: 30 days Max Daily Amount: 3 tablets 11/11/23 12/11/23  Anthony Irizarry MD   tiZANidine (ZANAFLEX) 4 MG tablet Take 1 tablet by mouth 2 times daily as needed (neck muscle spasm) 10/10/23   Anthony Irizarry MD   ibuprofen (ADVIL;MOTRIN) 800 MG tablet Take 1 tablet by mouth 2 times daily as needed for Pain 5/10/23 6/15/23  Anthony Irizarry MD   propranolol (INDERAL) 10 MG tablet Take 10 mg by mouth 2 times daily  Patient not taking: Reported on 1/12/2023    Katina Loera MD   amoxicillin (AMOXIL) 500 MG capsule Take 1 capsule by mouth 3 times daily  Patient not taking: Reported on 8/10/2023    Katina Loera MD   QUEtiapine (SEROQUEL) 25 MG tablet Take 1 tablet by mouth 2 times daily    Katina Loera MD   Esomeprazole Magnesium (NEXIUM PO) Take by mouth    Katina Loera MD   busPIRone (BUSPAR) 15 MG tablet Take one(1) tablet three times daily.   Patient not taking: Reported on 8/10/2023 3/4/04   Katina Loera MD   FLUoxetine (PROZAC) 20 MG capsule Take 1 capsule by mouth daily    Katina Loera MD   zolpidem (AMBIEN) 10 MG tablet as necessary 3/4/04   Katina Loera MD   predniSONE (Kathee Ethan) 5 MG tablet TAKE ONE TABLET BY MOUTH FOUR TIMES A DAY  Patient not taking: Reported on 8/10/2023 6/8/18   Katina Loera MD   DiphenhydrAMINE HCl (BENADRYL ALLERGY PO) Take by mouth   Patient not taking: Reported on 11/9/2023    Katina Loera MD   levothyroxine (SYNTHROID) 100 MCG tablet Take 1.5 tablets by mouth Daily    Katina Loera MD   metoprolol (LOPRESSOR) 25 MG tablet Take 1 tablet by negative